# Patient Record
Sex: MALE
[De-identification: names, ages, dates, MRNs, and addresses within clinical notes are randomized per-mention and may not be internally consistent; named-entity substitution may affect disease eponyms.]

---

## 2020-01-29 ENCOUNTER — HOSPITAL ENCOUNTER (INPATIENT)
Dept: HOSPITAL 88 - ER | Age: 65
LOS: 7 days | Discharge: NURSING, REHAB OR LONG-TERM CARE | DRG: 871 | End: 2020-02-05
Attending: INTERNAL MEDICINE | Admitting: INTERNAL MEDICINE
Payer: MEDICARE

## 2020-01-29 VITALS — HEIGHT: 63 IN | BODY MASS INDEX: 27.46 KG/M2 | WEIGHT: 155 LBS

## 2020-01-29 VITALS — SYSTOLIC BLOOD PRESSURE: 184 MMHG | DIASTOLIC BLOOD PRESSURE: 89 MMHG

## 2020-01-29 DIAGNOSIS — J18.1: ICD-10-CM

## 2020-01-29 DIAGNOSIS — N39.0: ICD-10-CM

## 2020-01-29 DIAGNOSIS — B96.20: ICD-10-CM

## 2020-01-29 DIAGNOSIS — E87.1: ICD-10-CM

## 2020-01-29 DIAGNOSIS — N17.9: ICD-10-CM

## 2020-01-29 DIAGNOSIS — A41.9: Primary | ICD-10-CM

## 2020-01-29 DIAGNOSIS — Z16.12: ICD-10-CM

## 2020-01-29 DIAGNOSIS — K59.00: ICD-10-CM

## 2020-01-29 DIAGNOSIS — E86.0: ICD-10-CM

## 2020-01-29 DIAGNOSIS — E87.2: ICD-10-CM

## 2020-01-29 LAB
ALBUMIN SERPL-MCNC: 2.4 G/DL (ref 3.5–5)
ALBUMIN/GLOB SERPL: 0.5 {RATIO} (ref 0.8–2)
ALP SERPL-CCNC: 160 IU/L (ref 40–150)
ALT SERPL-CCNC: 40 IU/L (ref 0–55)
ANION GAP SERPL CALC-SCNC: 16.5 MMOL/L (ref 8–16)
BACTERIA URNS QL MICRO: (no result) /HPF
BASOPHILS # BLD AUTO: 0.1 10*3/UL (ref 0–0.1)
BASOPHILS NFR BLD AUTO: 0.4 % (ref 0–1)
BILIRUB UR QL: NEGATIVE
BUN SERPL-MCNC: 45 MG/DL (ref 7–26)
BUN/CREAT SERPL: 20 (ref 6–25)
CALCIUM SERPL-MCNC: 8.7 MG/DL (ref 8.4–10.2)
CHLORIDE SERPL-SCNC: 97 MMOL/L (ref 98–107)
CK MB SERPL-MCNC: 2.4 NG/ML (ref 0–5)
CK SERPL-CCNC: 35 IU/L (ref 30–200)
CLARITY UR: (no result)
CO2 SERPL-SCNC: 19 MMOL/L (ref 22–29)
COLOR UR: YELLOW
DEPRECATED NEUTROPHILS # BLD AUTO: 10.6 10*3/UL (ref 2.1–6.9)
EGFRCR SERPLBLD CKD-EPI 2021: 29 ML/MIN (ref 60–?)
EOSINOPHIL # BLD AUTO: 0.1 10*3/UL (ref 0–0.4)
EOSINOPHIL NFR BLD AUTO: 0.6 % (ref 0–6)
EPI CELLS URNS QL MICRO: (no result) /LPF
ERYTHROCYTE [DISTWIDTH] IN CORD BLOOD: 12 % (ref 11.7–14.4)
GLOBULIN PLAS-MCNC: 4.4 G/DL (ref 2.3–3.5)
GLUCOSE SERPLBLD-MCNC: 183 MG/DL (ref 74–118)
HCT VFR BLD AUTO: 47.4 % (ref 38.2–49.6)
HGB BLD-MCNC: 17.2 G/DL (ref 14–18)
KETONES UR QL STRIP.AUTO: NEGATIVE
LEUKOCYTE ESTERASE UR QL STRIP.AUTO: (no result)
LYMPHOCYTES # BLD: 0.6 10*3/UL (ref 1–3.2)
LYMPHOCYTES NFR BLD AUTO: 4.3 % (ref 18–39.1)
MCH RBC QN AUTO: 33 PG (ref 28–32)
MCHC RBC AUTO-ENTMCNC: 36.3 G/DL (ref 31–35)
MCV RBC AUTO: 90.8 FL (ref 81–99)
MONOCYTES # BLD AUTO: 2.3 10*3/UL (ref 0.2–0.8)
MONOCYTES NFR BLD AUTO: 16.6 % (ref 4.4–11.3)
NEUTS SEG NFR BLD AUTO: 76 % (ref 38.7–80)
NITRITE UR QL STRIP.AUTO: NEGATIVE
PLATELET # BLD AUTO: 231 X10E3/UL (ref 140–360)
POTASSIUM SERPL-SCNC: 3.5 MMOL/L (ref 3.5–5.1)
PROT UR QL STRIP.AUTO: (no result)
RBC # BLD AUTO: 5.22 X10E6/UL (ref 4.3–5.7)
SODIUM SERPL-SCNC: 129 MMOL/L (ref 136–145)
SP GR UR STRIP: 1.01 (ref 1.01–1.02)
UROBILINOGEN UR STRIP-MCNC: 0.2 MG/DL (ref 0.2–1)
WBC #/AREA URNS HPF: >50 /HPF (ref 0–5)

## 2020-01-29 PROCEDURE — 84100 ASSAY OF PHOSPHORUS: CPT

## 2020-01-29 PROCEDURE — 80048 BASIC METABOLIC PNL TOTAL CA: CPT

## 2020-01-29 PROCEDURE — 74470 X-RAY EXAM OF KIDNEY LESION: CPT

## 2020-01-29 PROCEDURE — 77001 FLUOROGUIDE FOR VEIN DEVICE: CPT

## 2020-01-29 PROCEDURE — 84484 ASSAY OF TROPONIN QUANT: CPT

## 2020-01-29 PROCEDURE — 85730 THROMBOPLASTIN TIME PARTIAL: CPT

## 2020-01-29 PROCEDURE — 99284 EMERGENCY DEPT VISIT MOD MDM: CPT

## 2020-01-29 PROCEDURE — 83735 ASSAY OF MAGNESIUM: CPT

## 2020-01-29 PROCEDURE — 85007 BL SMEAR W/DIFF WBC COUNT: CPT

## 2020-01-29 PROCEDURE — 76770 US EXAM ABDO BACK WALL COMP: CPT

## 2020-01-29 PROCEDURE — 36558 INSERT TUNNELED CV CATH: CPT

## 2020-01-29 PROCEDURE — 99152 MOD SED SAME PHYS/QHP 5/>YRS: CPT

## 2020-01-29 PROCEDURE — 80053 COMPREHEN METABOLIC PANEL: CPT

## 2020-01-29 PROCEDURE — 82550 ASSAY OF CK (CPK): CPT

## 2020-01-29 PROCEDURE — 74176 CT ABD & PELVIS W/O CONTRAST: CPT

## 2020-01-29 PROCEDURE — 36415 COLL VENOUS BLD VENIPUNCTURE: CPT

## 2020-01-29 PROCEDURE — 82553 CREATINE MB FRACTION: CPT

## 2020-01-29 PROCEDURE — 81001 URINALYSIS AUTO W/SCOPE: CPT

## 2020-01-29 PROCEDURE — 85027 COMPLETE CBC AUTOMATED: CPT

## 2020-01-29 PROCEDURE — 85610 PROTHROMBIN TIME: CPT

## 2020-01-29 PROCEDURE — 87205 SMEAR GRAM STAIN: CPT

## 2020-01-29 PROCEDURE — 85025 COMPLETE CBC W/AUTO DIFF WBC: CPT

## 2020-01-29 PROCEDURE — 87086 URINE CULTURE/COLONY COUNT: CPT

## 2020-01-29 PROCEDURE — 71045 X-RAY EXAM CHEST 1 VIEW: CPT

## 2020-01-29 PROCEDURE — 83036 HEMOGLOBIN GLYCOSYLATED A1C: CPT

## 2020-01-29 PROCEDURE — 76937 US GUIDE VASCULAR ACCESS: CPT

## 2020-01-29 PROCEDURE — 87070 CULTURE OTHR SPECIMN AEROBIC: CPT

## 2020-01-29 PROCEDURE — 82948 REAGENT STRIP/BLOOD GLUCOSE: CPT

## 2020-01-29 PROCEDURE — 87186 SC STD MICRODIL/AGAR DIL: CPT

## 2020-01-29 RX ADMIN — SODIUM CHLORIDE SCH MLS/HR: 9 INJECTION, SOLUTION INTRAVENOUS at 13:52

## 2020-01-29 RX ADMIN — DOCUSATE SODIUM SCH MG: 100 TABLET, FILM COATED ORAL at 21:39

## 2020-01-29 RX ADMIN — CEFTRIAXONE SCH MLS/HR: 100 INJECTION, POWDER, FOR SOLUTION INTRAVENOUS at 13:52

## 2020-01-29 RX ADMIN — SODIUM BICARBONATE SCH MG: 650 TABLET ORAL at 21:39

## 2020-01-29 NOTE — NUR
H&P



cc: constipation



HPI: 64yoM, PCP none, developed constipatoin and lower abdominal pain.  Some 
nausea;  came to ED for eval.



PMH: HTN, cig use

PSHx: none

Allergies; none

FH/SH: ; 1/4ppd cigs; no illicits

Meds; see MAR

ROS: no f/c/s/SOB/cp/leg pain/no back pain/confusion/focal limb weakness.  



v/s; revd

PE:

tired appearing

anicteirc

ns1s2

mod bs

soft nd; mild tenderness lower abdomen

no e/t

skin dry

flat affect

a&ox3; marquez



labs/meds revd



A/P: 64yoM

Constipation

DEJON

Dehydration

UTI

Hyponatremia

Metabolic acidosis



PLAN

IVF; bicarbs; Bowel regimen; IV abx; f/u cx; Check labs in am.

SCD/pepcimilagros Conklin MD, PhD.

## 2020-01-29 NOTE — DIAGNOSTIC IMAGING REPORT
EXAM: CT Abdomen and Pelvis WITHOUT intravenous contrast  



INDICATION: Abdominal pain



COMPARISON: None.



TECHNIQUE: Abdomen and pelvis were scanned utilizing a multidetector helical

scanner from the lung base to the pubic symphysis without administration of IV

contrast. Coronal and sagittal reformations were obtained. 

     

IV CONTRAST: None

ORAL CONTRAST: Gastrografin

            

COMPLICATIONS: None



RADIATION DOSE:

Total DLP:  362.7 mGy*cm



Dose modulation, iterative reconstruction, and/or weight based adjustment of

the mA/kV was utilized to reduce the radiation dose to as low as reasonably

achievable. 



FINDINGS:

LOWER THORAX: Scattered atherosclerotic coronary artery calcifications.



HEPATOBILIARY: No focal liver lesion. Unremarkable gallbladder.



SPLEEN: No splenomegaly.



PANCREAS: No focal masses or ductal dilatation.



ADRENALS: No adrenal nodules.

KIDNEYS/URETERS: No hydronephrosis or renal calculi. Right lower pole exophytic

2.9 cm renal cyst. No solid mass lesions.

PELVIC ORGANS/BLADDER: Unremarkable.



PERITONEUM / RETROPERITONEUM: No free air or fluid.

LYMPH NODES: No lymphadenopathy.

VESSELS: Scattered atherosclerotic calcifications of the nonaneurysmal

abdominal aorta and major branches.



GI TRACT: Sigmoid and distal descending colon diverticulosis without CT

evidence of diverticulitis. No abnormal bowel thickening. No bowel obstruction.



BONES AND SOFT TISSUES: No acute osseous injury. No suspicious lytic or blastic

lesions.



IMPRESSION: 

No acute findings in the abdomen or pelvis. Specifically, no evidence of small

bowel obstruction.



Signed by: Angelo Rolon MD on 1/29/2020 12:47 PM

## 2020-01-29 NOTE — NUR
RECEIVE DPT IN BED AOX3 .RESPIRATIONS ARE EVEN AND UNLABORED .C/O ABD PAIN CALL LIGHT WITH 
IN REACH.CONTINUE TO MONITOR

## 2020-01-29 NOTE — XMS REPORT
Patient Summary Document

                             Created on: 2020



RUBENS LATHAM

External Reference #: [id number]

: 1955

Sex: Male



Demographics







                          Address                   [address] [place]  [address]

 

                          Home Phone                [phone]

 

                          Preferred Language        Unknown

 

                          Marital Status            Unknown

 

                          Buddhism Affiliation     Unknown

 

                          Race                      Unknown

 

                          Ethnic Group              Unknown





Author







                          Author                    [facility]

 

                          Address                   Unknown

 

                          Phone                     Unavailable







Support







                Name            Relationship    Address         Phone

 

                    ADA LATHAM       PRS                 [address]

[place], [place]  [id number] [phone]

 

                    ADA LATHAM       PRS                 [address] [place], [place]  [id number] [phone]







Care Team Providers







                    Care Team Member Name    Role                Phone

 

                          Unavailable               Unavailable







Payers







             Payer Name    Policy Type    Policy Number    Effective Date    Expiration Date







Problems

This patient has no known problems.



Allergies, Adverse Reactions, Alerts







          Allergy Name    Allergy Type    Status    Severity    Reaction(s)    Onset Date    Inactive 

Date                      Treating Clinician        Comments

 

        No Known Allergies    DA      Active    U               2019 00:00:00                     







Medications

This patient has no known medications.



Results







           Test Description    Test Time    Test Comments    Text Results    Atomic Results    Result

 Comments

 

                GLUBED          2019 12:17:00                      

 

   

 

                GLUBED (test code=GLUBED)    126 mg/dL                 Performed by certified  at

 [facility]





[id number] 08:03:00* 





                Test Item       Value           Reference Range    Comments

 

                GLUBED (test code=GLUBED)    163 mg/dL                 Performed by certified  at

 [facility]





[id number] 20:29:00* 





                Test Item       Value           Reference Range    Comments

 

                GLUBED (test code=GLUBED)    173 mg/dL                 Performed by certified  at

 [facility]





[id number] 16:41:00* 





                Test Item       Value           Reference Range    Comments

 

                GLUBED (test code=GLUBED)    140 mg/dL                 Performed by certified  at

 [facility]





[id number] 12:50:00* 





                Test Item       Value           Reference Range    Comments

 

                GLUBED (test code=GLUBED)    189 mg/dL                 Performed by certified  at

 [facility]





[id number] 08:22:00* 





                Test Item       Value           Reference Range    Comments

 

                GLUBED (test code=GLUBED)    129 mg/dL                 Performed by certified  at

 [facility]





BASIC METABOLIC [id number] 07:55:00* 





                Test Item       Value           Reference Range    Comments

 

                SODIUM (test code=NA)    137 mmol/L      136-145          

 

                POTASSIUM (test code=K)    3.2 mmol/L      3.5-5.1          

 

                CHLORIDE (test code=CL)    107.0 mmol/L               

 

                CARBON DIOXIDE (test code=CO2)    19.0 mmol/L     21-32            

 

                ANION GAP (test code=GAP)    14.2            10-20            

 

                GLUCOSE (test code=GLU)    126 mg/dL                  

 

                BLOOD UREA NITROGEN (test code=BUN)    12 mg/dL        7-18             

 

                GLOMERULAR FILTRATION RATE (test code=GFR)    > 60 mL/min     >=60            Estimated GFR by 

using Modified MDRD formula.Chronic kidney disease is defined as either kidney 
damageor GFR <60 mL/min/1.73 m2 for >3 months.

 

                CREATININE (test code=CREAT)    1.00 mg/dL      0.7-1.3          

 

                BUN/CREATININE RATIO (test code=BUN/CREA)    12.0            10-20            

 

                CALCIUM (test code=CA)    8.3 mg/dL       8.5-10.1         





BASIC METABOLIC [id number] 07:38:00* 





                Test Item       Value           Reference Range    Comments

 

                SODIUM (test code=NA)    137 mmol/L      136-145          

 

                POTASSIUM (test code=K)    3.2 mmol/L      3.5-5.1          

 

                CHLORIDE (test code=CL)    107.0 mmol/L               

 

                CARBON DIOXIDE (test code=CO2)     mmol/L         21-32            

 

                ANION GAP (test code=GAP)                    10-20            

 

                GLUCOSE (test code=GLU)     mg/dL                     

 

                BLOOD UREA NITROGEN (test code=BUN)     mg/dL          7-18             

 

                GLOMERULAR FILTRATION RATE (test code=GFR)     mL/min         >=60             

 

                CREATININE (test code=CREAT)     mg/dL          0.7-1.3          

 

                BUN/CREATININE RATIO (test code=BUN/CREA)                    10-20            

 

                CALCIUM (test code=CA)    8.3 mg/dL       8.5-10.1         





CBC W/AUTO [id number] 07:04:00* 





                Test Item       Value           Reference Range    Comments

 

                WHITE BLOOD CELL (test code=WBC)    10.1 K/mm3      4.5-12.5         

 

                RED BLOOD CELL (test code=RBC)    4.73 mill/mm3    4.0-5.8          

 

                HEMOGLOBIN (test code=HGB)    16.1 gram/dL    13.0-17.5       RESULT VERIFIED BY REPEAT ANALYSIS



 

                HEMATOCRIT (test code=HCT)    45.7 %          42.0-52.0        

 

                MEAN CELL VOLUME (test code=MCV)    96.6 fL         80-98            

 

                MEAN CELL HGB (test code=MCH)    34.0 picogram    27.0-33.0        

 

                MEAN CELL HGB CONCETRATION (test code=MCHC)    35.2 gram/dL    33.0-36.0        

 

                RED CELL DISTRIBUTION WIDTH (test code=RDW)    11.8 %          11.6-16.2        

 

                RED CELL DISTRIBUTION WIDTH SD (test code=RDW-SD)    42.0 fL         37.0-51.0        

 

                PLATELET COUNT (test code=PLT)    179 K/mm3       150-450          

 

                MEAN PLATELET VOLUME (test code=MPV)    10.4 fL         6.7-11.0         

 

                NEUTROPHIL % (test code=NT%)    73.5 %          39.0-69.0        

 

                IMMATURE GRANULOCYTE % (test code=IG%)    0.7 %           0.0-5.0          

 

                LYMPHOCYTE % (test code=LY%)    10.8 %          25.0-55.0        

 

                MONOCYTE % (test code=MO%)    14.0 %          0.0-10.0         

 

                EOSINOPHIL % (test code=EO%)    0.7 %           0.0-5.0          

 

                BASOPHIL % (test code=BA%)    0.3 %           0.0-1.0          

 

                NUCLEATED RBC % (test code=NRBC%)    0.0 %           0-0              

 

                NEUTROPHIL # (test code=NT#)    7.43 K/mm3      1.8-7.7          

 

                IMMATURE GRANULOCYTE # (test code=IG#)    0.07 x10 3/uL    0-0.03           

 

                LYMPHOCYTE # (test code=LY#)    1.09 K/mm3      1.0-5.0          

 

                MONOCYTE # (test code=MO#)    1.41 K/mm3      0-0.8            

 

                EOSINOPHIL # (test code=EO#)    0.07 K/mm3      0.0-0.5          

 

                BASOPHIL # (test code=BA#)    0.03 K/mm3      0.0-0.2          

 

                NUCLEATED RBC # (test code=NRBC#)    0.00 K/mm3      0.0-0.1          

 

                MANUAL DIFF REQUIRED (test code=MDIFF)    NO                               





[id number] 21:08:00* 





                Test Item       Value           Reference Range    Comments

 

                GLUBED (test code=GLUBED)    187 mg/dL                 Performed by certified  at

 [facility]





[id number] 17:16:00* 





                Test Item       Value           Reference Range    Comments

 

                GLUBED (test code=GLUBED)    165 mg/dL                 Performed by certified  at

 [facility]





[id number] 12:20:00* 





                Test Item       Value           Reference Range    Comments

 

                GLUBED (test code=GLUBED)    210 mg/dL                 Performed by certified  at

 [facility]





[id number] 08:55:00* 





                Test Item       Value           Reference Range    Comments

 

                GLUBED (test code=GLUBED)    149 mg/dL                 Performed by certified  at

 [facility]





AG STREPTOCOCCUS [id number] 06:22:00* 





                Test Item       Value           Reference Range    Comments

 

                AG STREPTOCOCCUS PNEUMO (test code=STREPPNAG)    NEGATIVE        NEGATIVE         





LEGIONELLA ANTIGEN,URINE,[id number] 06:22:00* 





                Test Item       Value           Reference Range    Comments

 

                LEGIONELLA ANTIGEN,URINE,SARAHI (test code=LEGAGUR)    NEGATIVE        NEGATIVE         





URINALYSIS [id number] 03:45:00* 





                Test Item       Value           Reference Range    Comments

 

                UA COLOR (test code=COLU)    YELLOW          YELLOW           

 

                UA APPEARANCE (test code=APPU)    CLEAR           CLEAR            

 

                UA GLUCOSE DIPSTICK (test code=DGLUU)    NEGATIVE mg/dL    NEGATIVE         

 

                UA BILIRUBIN DIPSTICK (test code=BILU)    NEGATIVE mg/dL    NEGATIVE         

 

                UA KETONE DIPSTICK (test code=KETU)    NEGATIVE mg/dL    NEGATIVE         

 

                UA SPECIFIC GRAVITY (test code=SGU)    1.011           1.001-1.035      

 

                UA BLOOD DIPSTICK (test code=BRADEN)    2+ (Moderate) mg/dL    NEGATIVE         

 

                UA PH DIPSTICK (test code=KATERINE)    5.0             5.0-8.0          

 

                UA PROTEIN DIPSTICK (test code=PROU)    30 (1+) mg/dL    NEGATIVE         

 

                UA UROBILINIOGEN DIPSTICK (test code=URO)    NEGATIVE mg/dL    NEGATIVE         

 

                UA NITRITE DIPSTICK (test code=MIREILLE)    NEGATIVE        NEGATIVE         

 

                UA LEUKOCYTE ESTERASE DIPSTICK (test code=LEUU)    1+ uL           NEGATIVE         

 

                UA WBC (test code=WBCU)    >50 per HPF     0-5              

 

                UA RBC (test code=RBCU)    3-5 #/HPF       0-5              

 

                UA EPITHELIAL CELLS (test code=EPIU)    FEW per HPF     FEW              

 

                UA BACTERIA (test code=BACU)    FEW #/HPF       NONE             

 

                UA MUCUS (test code=MUCU)    FEW #/LPF       FEW              





Urine Source? Clean CatchURINALYSIS [id number] 03:08:00* 





                Test Item       Value           Reference Range    Comments

 

                UA COLOR (test code=COLU)    YELLOW          YELLOW           

 

                UA APPEARANCE (test code=APPU)    CLEAR           CLEAR            

 

                UA GLUCOSE DIPSTICK (test code=DGLUU)    NEGATIVE mg/dL    NEGATIVE         

 

                UA BILIRUBIN DIPSTICK (test code=BILU)    NEGATIVE mg/dL    NEGATIVE         

 

                UA KETONE DIPSTICK (test code=KETU)    NEGATIVE mg/dL    NEGATIVE         

 

                UA SPECIFIC GRAVITY (test code=SGU)    1.011           1.001-1.035      

 

                UA BLOOD DIPSTICK (test code=BRADEN)    2+ (Moderate) mg/dL    NEGATIVE         

 

                UA PH DIPSTICK (test code=KATERINE)    5.0             5.0-8.0          

 

                UA PROTEIN DIPSTICK (test code=PROU)    30 (1+) mg/dL    NEGATIVE         

 

                UA UROBILINIOGEN DIPSTICK (test code=URO)    NEGATIVE mg/dL    NEGATIVE         

 

                UA NITRITE DIPSTICK (test code=MIREILLE)    NEGATIVE        NEGATIVE         

 

                UA LEUKOCYTE ESTERASE DIPSTICK (test code=LEUU)     uL             NEGATIVE         

 

                UA WBC (test code=WBCU)    >50 per HPF     0-5              

 

                UA RBC (test code=RBCU)    3-5 #/HPF       0-5              

 

                UA EPITHELIAL CELLS (test code=EPIU)    FEW per HPF     FEW              

 

                UA BACTERIA (test code=BACU)    FEW #/HPF       NONE             

 

                UA MUCUS (test code=MUCU)    FEW #/LPF       FEW              





Urine Source? Clean [id number] 23:11:00* 





                Test Item       Value           Reference Range    Comments

 

                TROPONIN-I (test code=TROPI)    0.294 ng/mL     0-0.045         Results called to [id number] by

 V.LAB.AMARILYS1 19 2310Critical results verified and read back by Nurse? Y





COMMENTS TO PHLEBOTOMIST: COLLECT 3 HOURS AFTER PREVIOUS                        
  [id number] 22:52:00* 





                Test Item       Value           Reference Range    Comments

 

                GLYCOSYLATED HEMOGLOBIN (HA1C) (test code=GLYHGB)    7.0 % HbA1      4.8-6.0          

 

                ESTIMATED AVERAGE GLUCOSE (test code=EAG)    154 MG/DL                        





[id number] 21:24:00* 





                Test Item       Value           Reference Range    Comments

 

                GLUBED (test code=GLUBED)    150 mg/dL                 Performed by certified  at

 [facility]





LIPID PROFILE (CORONARY RISK)2019 21:17:00* 





                Test Item       Value           Reference Range    Comments

 

                TRIGLYCERIDES (test code=TRIG)    96 mg/dL                   

 

                CHOLESTEROL (test code=CHOL)    118 mg/dL       0-200            

 

                CHOLESTEROL/HDL RATIO (test code=CHOLHDL)    3.0 RATIO       0-4.9           RISK ASSOCIATED WITH

 CHOL/HDL RATIOS:     Risk          Male       Female1/2 AVERAGE        3.43    
   3.27AVERAGE            4.97        4.442X AVERAGE         9.55        7.053X 
AVERAGE         23.39      11.04 REFERENCE VALUE IS RELATED TO RISK LEVELS 
ASRECOMMENDED BY THE WINNIE. HEART, LUNG, AND BLOOD INST.

 

                HDL CHOLESTEROL (test code=HDL)    39 mg/dL        40-60            

 

                LIPOPROTEIN LDL (test code=LDL)    62 mg/dL        100-129         ===========================================================Reference

 Interval:          mg/dL          
mmol/L-----------------------------------------------------------Optimal        
             <100           <2.6Near/above optimal          100-129        2.6-
3.3Borderline High             130-159        3.4-4.1High                       
160-189        4.1-4.9Very High                    &gt;=190          
>=4.9=========This LDL result is a direct measurement.=========





LACTIC [id number] 17:22:00* 





                Test Item       Value           Reference Range    Comments

 

                LACTIC ACID (test code=LACT)    1.4 MMOL/L      0.4-1.9          





[id number] 17:22:00* 





                Test Item       Value           Reference Range    Comments

 

                TROPONIN-I (test code=TROPI)    0.18 ng/mL      0.00-0.056      Results called to KARINA MEDLEYLAB.AV1 19 1722Critical results verified and read back by Nurse? Y





COMMENTS TO PHLEBOTOMIST: COLLECT 3 HOURS AFTER PREVIOUS                        
  SAMPLEPROCALCITONIN (PCT)2019 16:38:00* 





                Test Item       Value           Reference Range    Comments

 

                PROCALCITONIN (PCT) (test code=PROCAL)    0.22 ng/ml                      Concentration   Interpretation

   (ng/mL)      -------------   
--------------------------------------------<0.51           Sepsis is not 
likely.                Local bacterial infection is possible.                
(LOW RISK for progression to Sepsis) 0.51 - 2.00     Sepsis is possible, but 
other conditions                are known to elevate PCT as well.               
(MODERATE RISK for progression to Sepsis) > 2.00          Sepsis is likely, 
unless other causes are                known.                (HIGH RISK for 
progression to Severe Sepsis                or Septic Shock) 10.00           
High likelihood of Severe Sepsis or Septic  or higher     Shock.                
*Increased PCT levels may not always be related to systemic bacterial 
infection.*Low PCT levels do not automatically exclude the presence of bacterial
infection.*All results should be interpreted taking into account the  patients 
history.





CBC W/MANUAL [id number] 15:02:00* 





                Test Item       Value           Reference Range    Comments

 

                WHITE BLOOD CELL (test code=WBC)    12.0 K/mm3      4.5-12.5         

 

                RED BLOOD CELL (test code=RBC)    5.48 mill/mm3    4.0-5.8          

 

                HEMOGLOBIN (test code=HGB)    18.8 gram/dL    13.0-17.5        

 

                HEMATOCRIT (test code=HCT)    52.2 %          42.0-52.0        

 

                MEAN CELL VOLUME (test code=MCV)    95.3 fL         80-98            

 

                MEAN CELL HGB (test code=MCH)    34.3 picogram    27.0-33.0        

 

                MEAN CELL HGB CONCETRATION (test code=MCHC)    36.0 gram/dL    33.0-36.0        

 

                RED CELL DISTRIBUTION WIDTH (test code=RDW)    12.1 %          11.6-16.2        

 

                RED CELL DISTRIBUTION WIDTH SD (test code=RDW-SD)    41.6 fL         39.2-49.5        

 

                PLATELET COUNT (test code=PLT)    219 K/mm3       150-450          

 

                MEAN PLATELET VOLUME (test code=MPV)    10.2 fL         6.7-11.0         

 

                NEUTROPHIL # (test code=NT#)    10.48 K/mm3     1.8-7.7          

 

                LYMPHOCYTE # (test code=LY#)    0.25 K/mm3      1.0-5.0          

 

                MONOCYTE # (test code=MO#)    1.28 K/mm3      0-0.8            

 

                EOSINOPHIL # (test code=EO#)    0.01 K/mm3      0.0-0.5          

 

                BASOPHIL # (test code=BA#)    0.02 K/mm3      0.0-0.2          

 

                MANUAL DIFF REQUIRED (test code=MDIFF)    YES                              

 

                STAIN ACCEPTABILITY (test code=STN ACCEPTABLE)    STAIN ACCEPTABLE                     

 

                TOTAL CELLS COUNTED (test code=TCC)    100 #CELLS                       

 

                SEGMENTED NEUTROPHILS (test code=SEG)    89 %            39-69            

 

                LYMPHOCYTE (test code=LYMPH)    2 %             25-55            

 

                MONOCYTE (test code=MON)    9 %             0-10             

 

                MORPHOLOGY COMMENT (test code=MOC)    NORMAL                           

 

                PLATELET ESTIMATE (test code=PLTEST)    ADEQUATE                         

 

                PLATELET MORPHOLOGY (test code=PLTMORPH)    SIZE VARIABLE                     





- XR CHEST 1 -96-93 14:46:00  Name: RUBENS LATHAM                         
[facility]    : 1955 Age/S:63  /M            [address] 
[facility] [address]           Unit#:[id number]     Loc: [place] [address]               Phys: Ted Macias MD                              
               Acct#: [id number] Dis Date:                   PHONE #: 
[phone]      Status: REG ER                                   FAX #: 
[phone]      Exam Date: 2019           Reason: CODE SEPSIS           
                             EXAMS:                                             
 CPT CODE:      [id number] XR CHEST 1 V                               [id number]      
                     REASON FOR EXAM: CODE SEPSIS, weakness, dizziness          
    EXAM ORDER DATE: 2019 2:08 PM               Ordering M.D.: Ted Macias MD                PROCEDURE:  - XR CHEST 1 V               
COMPARISON:               FINDINGS:  Portable AP frontal view of the chest 
obtained at 2:28 PM       shows patchy airspace opacities of the left base. 
There is no evidence       of effusion. The heart size is minimally enlarged. 
Pulmonary       vasculatures are minimally congested.                  IMP
RESSION: Patchy left basilar atelectasis          ** Electronically Signed by ANA Silvestre on 2019 at 1446 **                      Reported and signed by:
Kenneth Silvestre M.D.                       CC: Ted Macias MD                   
                                                                                
              Technologist: MUNIR ZUNIGA, RT(R),CT                           
  Trnscrpt Data: 2019 (1446) t.SDR.VTL                          Orig Print
D/T: S: 2019 (1452)                         PAGE  1                       
Signed Report                               B-TYPE NATRIURETIC [id number]
14:44:00* 





                Test Item       Value           Reference Range    Comments

 

                B-TYPE NATRIURETIC PEPTIDE (test code=BNP)    237 pg/mL       0-100            





LACTIC [id number] 14:34:00* 





                Test Item       Value           Reference Range    Comments

 

                LACTIC ACID (test code=LACT)    2.1 MMOL/L      0.4-1.9         Results called to KARINA Gilliam V.LAB.AV1 19 1434Critical results verified and read back by Nurse? Y





BASIC METABOLIC [id number] 14:33:00* 





                Test Item       Value           Reference Range    Comments

 

                SODIUM (test code=NA)    133 mmol/L      135-148          

 

                POTASSIUM (test code=K)    3.3 mmol/L      3.5-5.1          

 

                CHLORIDE (test code=CL)    99 mmol/L       101-109          

 

                CARBON DIOXIDE (test code=CO2)    16.9 mmol/L     21-32            

 

                ANION GAP (test code=GAP)    20 mmol/L       10-20            

 

                GLUCOSE (test code=GLU)    250 mg/dL                  

 

                BLOOD UREA NITROGEN (test code=BUN)    10 mg/dL        3-21             

 

                GLOMERULAR FILTRATION RATE (test code=GFR)    46 mL/min       >=60            Estimated GFR by using

 Modified MDRD formula.Chronic kidney disease is defined as either kidney 
damageor GFR <60 mL/min/1.73 m2 for >3 months.

 

                CREATININE (test code=CREAT)    1.52 mg/dL      0.55-1.3         

 

                BUN/CREATININE RATIO (test code=BUN/CREA)    6.6             10-20            

 

                CALCIUM (test code=CA)    8.7 mg/dL       8.4-10.2         





HEPATIC FUNCTION [id number] 14:33:00* 





                Test Item       Value           Reference Range    Comments

 

                TOTAL PROTEIN (test code=PROT)    8.5 g/dL        6.5-8.4          

 

                ALBUMIN (test code=ALB)    3.4 g/dL        3.4-4.8          

 

                GLOBULIN (test code=GLOB)    5.1 G/DL        1-10             

 

                ALBUMIN/GLOBULIN RATIO (test code=A/G)    0.7 RATIO       0.75-1.50        

 

                BILIRUBIN TOTAL (test code=BILT)    0.80 mg/dL      0.0-1.0          

 

                BILIRUBIN DIRECT (test code=BILD)    0.20 mg/dL      0.0-0.30         

 

                SGOT/AST (test code=AST)    45 U/L          6-32             

 

                SGPT/ALT (test code=ALT)    87 U/L          12-78           Note: Change in REFERENCE RANGE due to 

new reagent method.

 

                ALKALINE PHOSPHATASE TOTAL (test code=ALKP)    89 U/L                     





[id number] 14:33:00* 





                Test Item       Value           Reference Range    Comments

 

                LIPASE (test code=LIP)    165 U/L         128-270          





[id number] 14:33:00* 





                Test Item       Value           Reference Range    Comments

 

                TROPONIN-I (test code=TROPI)    0.19 ng/mL      0.00-0.056      Results called to [id number] results verified and read back by Nurse? Y





CBC W/MANUAL [id number] 14:25:00* 





                Test Item       Value           Reference Range    Comments

 

                WHITE BLOOD CELL (test code=WBC)    12.0 K/mm3      4.5-12.5         

 

                RED BLOOD CELL (test code=RBC)    5.48 mill/mm3    4.0-5.8          

 

                HEMOGLOBIN (test code=HGB)    18.8 gram/dL    13.0-17.5        

 

                HEMATOCRIT (test code=HCT)    52.2 %          42.0-52.0        

 

                MEAN CELL VOLUME (test code=MCV)    95.3 fL         80-98            

 

                MEAN CELL HGB (test code=MCH)    34.3 picogram    27.0-33.0        

 

                MEAN CELL HGB CONCETRATION (test code=MCHC)    36.0 gram/dL    33.0-36.0        

 

                RED CELL DISTRIBUTION WIDTH (test code=RDW)    12.1 %          11.6-16.2        

 

                RED CELL DISTRIBUTION WIDTH SD (test code=RDW-SD)    41.6 fL         39.2-49.5        

 

                PLATELET COUNT (test code=PLT)    219 K/mm3       150-450          

 

                MEAN PLATELET VOLUME (test code=MPV)    10.2 fL         6.7-11.0         

 

                NEUTROPHIL # (test code=NT#)    10.48 K/mm3     1.8-7.7          

 

                LYMPHOCYTE # (test code=LY#)    0.25 K/mm3      1.0-5.0          

 

                MONOCYTE # (test code=MO#)    1.28 K/mm3      0-0.8            

 

                EOSINOPHIL # (test code=EO#)    0.01 K/mm3      0.0-0.5          

 

                BASOPHIL # (test code=BA#)    0.02 K/mm3      0.0-0.2          

 

                MANUAL DIFF REQUIRED (test code=MDIFF)    YES                              

 

                STAIN ACCEPTABILITY (test code=STN ACCEPTABLE)                                     

 

                TOTAL CELLS COUNTED (test code=TCC)     #CELLS                          

 

                SEGMENTED NEUTROPHILS (test code=SEG)     %              39-69            

 

                LYMPHOCYTE (test code=LYMPH)     %              25-55            

 

                MONOCYTE (test code=MON)     %              0-10             

 

                MORPHOLOGY COMMENT (test code=MOC)                                     

 

                PLATELET ESTIMATE (test code=PLTEST)                                     

 

                PLATELET MORPHOLOGY (test code=PLTMORPH)                                     





CBC W/MANUAL [id number] 14:23:00* 





                Test Item       Value           Reference Range    Comments

 

                WHITE BLOOD CELL (test code=WBC)    12.0 K/mm3      4.5-12.5         

 

                RED BLOOD CELL (test code=RBC)    5.48 mill/mm3    4.0-5.8          

 

                HEMOGLOBIN (test code=HGB)    18.8 gram/dL    13.0-17.5        

 

                HEMATOCRIT (test code=HCT)    52.2 %          42.0-52.0        

 

                MEAN CELL VOLUME (test code=MCV)    95.3 fL         80-98            

 

                MEAN CELL HGB (test code=MCH)    34.3 picogram    27.0-33.0        

 

                MEAN CELL HGB CONCETRATION (test code=MCHC)    36.0 gram/dL    33.0-36.0        

 

                RED CELL DISTRIBUTION WIDTH (test code=RDW)    12.1 %          11.6-16.2        

 

                RED CELL DISTRIBUTION WIDTH SD (test code=RDW-SD)    41.6 fL         39.2-49.5        

 

                PLATELET COUNT (test code=PLT)    219 K/mm3       150-450          

 

                MEAN PLATELET VOLUME (test code=MPV)    10.2 fL         6.7-11.0         

 

                NEUTROPHIL # (test code=NT#)    10.48 K/mm3     1.8-7.7          

 

                LYMPHOCYTE # (test code=LY#)    0.25 K/mm3      1.0-5.0          

 

                MONOCYTE # (test code=MO#)    1.28 K/mm3      0-0.8            

 

                EOSINOPHIL # (test code=EO#)    0.01 K/mm3      0.0-0.5          

 

                BASOPHIL # (test code=BA#)    0.02 K/mm3      0.0-0.2          

 

                MANUAL DIFF REQUIRED (test code=MDIFF)    YES                              

 

                STAIN ACCEPTABILITY (test code=STN ACCEPTABLE)                                     

 

                TOTAL CELLS COUNTED (test code=TCC)     #CELLS                          

 

                SEGMENTED NEUTROPHILS (test code=SEG)     %              39-69            

 

                LYMPHOCYTE (test code=LYMPH)     %              25-55            

 

                MONOCYTE (test code=MON)     %              0-10             

 

                EOSINOPHIL (test code=EOS)     %              0.0-5.0          

 

                CABOT RINGS (test code=CAB)                                     

 

                MORPHOLOGY COMMENT (test code=MOC)                                     

 

                PLATELET ESTIMATE (test code=PLTEST)                                     

 

                PLATELET MORPHOLOGY (test code=PLTMORPH)                                     





CBC W/MANUAL [id number] 14:23:00* 





                Test Item       Value           Reference Range    Comments

 

                WHITE BLOOD CELL (test code=WBC)    12.0 K/mm3      4.5-12.5         

 

                RED BLOOD CELL (test code=RBC)    5.48 mill/mm3    4.0-5.8          

 

                HEMOGLOBIN (test code=HGB)    18.8 gram/dL    13.0-17.5        

 

                HEMATOCRIT (test code=HCT)    52.2 %          42.0-52.0        

 

                MEAN CELL VOLUME (test code=MCV)    95.3 fL         80-98            

 

                MEAN CELL HGB (test code=MCH)    34.3 picogram    27.0-33.0        

 

                MEAN CELL HGB CONCETRATION (test code=MCHC)    36.0 gram/dL    33.0-36.0        

 

                RED CELL DISTRIBUTION WIDTH (test code=RDW)    12.1 %          11.6-16.2        

 

                RED CELL DISTRIBUTION WIDTH SD (test code=RDW-SD)    41.6 fL         39.2-49.5        

 

                PLATELET COUNT (test code=PLT)    219 K/mm3       150-450          

 

                MEAN PLATELET VOLUME (test code=MPV)    10.2 fL         6.7-11.0         

 

                NEUTROPHIL # (test code=NT#)    10.48 K/mm3     1.8-7.7          

 

                LYMPHOCYTE # (test code=LY#)    0.25 K/mm3      1.0-5.0          

 

                MONOCYTE # (test code=MO#)    1.28 K/mm3      0-0.8            

 

                EOSINOPHIL # (test code=EO#)    0.01 K/mm3      0.0-0.5          

 

                BASOPHIL # (test code=BA#)    0.02 K/mm3      0.0-0.2          

 

                MANUAL DIFF REQUIRED (test code=MDIFF)    YES                              

 

                STAIN ACCEPTABILITY (test code=STN ACCEPTABLE)                                     

 

                TOTAL CELLS COUNTED (test code=TCC)     #CELLS                          

 

                SEGMENTED NEUTROPHILS (test code=SEG)     %              39-69            

 

                LYMPHOCYTE (test code=LYMPH)     %              25-55            

 

                MONOCYTE (test code=MON)     %              0-10             

 

                EOSINOPHIL (test code=EOS)     %              0.0-5.0          

 

                CABOT RINGS (test code=CAB)                                     

 

                MORPHOLOGY COMMENT (test code=MOC)                                     

 

                PLATELET ESTIMATE (test code=PLTEST)                                     

 

                PLATELET MORPHOLOGY (test code=PLTMORPH)                                     





CBC W/MANUAL [id number] 14:23:00* 





                Test Item       Value           Reference Range    Comments

 

                WHITE BLOOD CELL (test code=WBC)    12.0 K/mm3      4.5-12.5         

 

                RED BLOOD CELL (test code=RBC)    5.48 mill/mm3    4.0-5.8          

 

                HEMOGLOBIN (test code=HGB)    18.8 gram/dL    13.0-17.5        

 

                HEMATOCRIT (test code=HCT)    52.2 %          42.0-52.0        

 

                MEAN CELL VOLUME (test code=MCV)    95.3 fL         80-98            

 

                MEAN CELL HGB (test code=MCH)    34.3 picogram    27.0-33.0        

 

                MEAN CELL HGB CONCETRATION (test code=MCHC)    36.0 gram/dL    33.0-36.0        

 

                RED CELL DISTRIBUTION WIDTH (test code=RDW)    12.1 %          11.6-16.2        

 

                RED CELL DISTRIBUTION WIDTH SD (test code=RDW-SD)    41.6 fL         39.2-49.5        

 

                PLATELET COUNT (test code=PLT)    219 K/mm3       150-450          

 

                MEAN PLATELET VOLUME (test code=MPV)    10.2 fL         6.7-11.0         

 

                NEUTROPHIL # (test code=NT#)    10.48 K/mm3     1.8-7.7          

 

                LYMPHOCYTE # (test code=LY#)    0.25 K/mm3      1.0-5.0          

 

                MONOCYTE # (test code=MO#)    1.28 K/mm3      0-0.8            

 

                EOSINOPHIL # (test code=EO#)    0.01 K/mm3      0.0-0.5          

 

                BASOPHIL # (test code=BA#)    0.02 K/mm3      0.0-0.2          

 

                MANUAL DIFF REQUIRED (test code=MDIFF)    YES                              

 

                STAIN ACCEPTABILITY (test code=STN ACCEPTABLE)                                     

 

                TOTAL CELLS COUNTED (test code=TCC)     #CELLS                          

 

                SEGMENTED NEUTROPHILS (test code=SEG)     %              39-69            

 

                LYMPHOCYTE (test code=LYMPH)     %              25-55            

 

                MONOCYTE (test code=MON)     %              0-10             

 

                EOSINOPHIL (test code=EOS)     %              0.0-5.0          

 

                MORPHOLOGY COMMENT (test code=MOC)                                     

 

                PLATELET ESTIMATE (test code=PLTEST)                                     

 

                PLATELET MORPHOLOGY (test code=PLTMORPH)                                     





CBC W/MANUAL [id number] 14:23:00* 





                Test Item       Value           Reference Range    Comments

 

                WHITE BLOOD CELL (test code=WBC)    12.0 K/mm3      4.5-12.5         

 

                RED BLOOD CELL (test code=RBC)    5.48 mill/mm3    4.0-5.8          

 

                HEMOGLOBIN (test code=HGB)    18.8 gram/dL    13.0-17.5        

 

                HEMATOCRIT (test code=HCT)    52.2 %          42.0-52.0        

 

                MEAN CELL VOLUME (test code=MCV)    95.3 fL         80-98            

 

                MEAN CELL HGB (test code=MCH)    34.3 picogram    27.0-33.0        

 

                MEAN CELL HGB CONCETRATION (test code=MCHC)    36.0 gram/dL    33.0-36.0        

 

                RED CELL DISTRIBUTION WIDTH (test code=RDW)    12.1 %          11.6-16.2        

 

                RED CELL DISTRIBUTION WIDTH SD (test code=RDW-SD)    41.6 fL         39.2-49.5        

 

                PLATELET COUNT (test code=PLT)    219 K/mm3       150-450          

 

                MEAN PLATELET VOLUME (test code=MPV)    10.2 fL         6.7-11.0         

 

                NEUTROPHIL # (test code=NT#)    10.48 K/mm3     1.8-7.7          

 

                LYMPHOCYTE # (test code=LY#)    0.25 K/mm3      1.0-5.0          

 

                MONOCYTE # (test code=MO#)    1.28 K/mm3      0-0.8            

 

                EOSINOPHIL # (test code=EO#)    0.01 K/mm3      0.0-0.5          

 

                BASOPHIL # (test code=BA#)    0.02 K/mm3      0.0-0.2          

 

                MANUAL DIFF REQUIRED (test code=MDIFF)    YES                              

 

                STAIN ACCEPTABILITY (test code=STN ACCEPTABLE)                                     

 

                TOTAL CELLS COUNTED (test code=TCC)     #CELLS                          

 

                SEGMENTED NEUTROPHILS (test code=SEG)     %              39-69            

 

                LYMPHOCYTE (test code=LYMPH)     %              25-55            

 

                MONOCYTE (test code=MON)     %              0-10             

 

                EOSINOPHIL (test code=EOS)     %              0.0-5.0          

 

                CABOT RINGS (test code=CAB)                                     

 

                MORPHOLOGY COMMENT (test code=MOC)                                     

 

                PLATELET ESTIMATE (test code=PLTEST)                                     

 

                PLATELET MORPHOLOGY (test code=PLTMORPH)                                     





[id number] 14:05:00* 





                Test Item       Value           Reference Range    Comments

 

                GLUBED (test code=GLUBED)    248 mg/dL                 Performed by certified  at

 [facility] Not[facility]~Notified Nurse~

## 2020-01-30 VITALS — SYSTOLIC BLOOD PRESSURE: 166 MMHG | DIASTOLIC BLOOD PRESSURE: 83 MMHG

## 2020-01-30 VITALS — DIASTOLIC BLOOD PRESSURE: 83 MMHG | SYSTOLIC BLOOD PRESSURE: 166 MMHG

## 2020-01-30 VITALS — SYSTOLIC BLOOD PRESSURE: 134 MMHG | DIASTOLIC BLOOD PRESSURE: 74 MMHG

## 2020-01-30 VITALS — DIASTOLIC BLOOD PRESSURE: 74 MMHG | SYSTOLIC BLOOD PRESSURE: 134 MMHG

## 2020-01-30 VITALS — SYSTOLIC BLOOD PRESSURE: 175 MMHG | DIASTOLIC BLOOD PRESSURE: 80 MMHG

## 2020-01-30 VITALS — SYSTOLIC BLOOD PRESSURE: 172 MMHG | DIASTOLIC BLOOD PRESSURE: 88 MMHG

## 2020-01-30 VITALS — SYSTOLIC BLOOD PRESSURE: 171 MMHG | DIASTOLIC BLOOD PRESSURE: 84 MMHG

## 2020-01-30 VITALS — SYSTOLIC BLOOD PRESSURE: 167 MMHG | DIASTOLIC BLOOD PRESSURE: 73 MMHG

## 2020-01-30 LAB
ALBUMIN SERPL-MCNC: 1.9 G/DL (ref 3.5–5)
ALBUMIN/GLOB SERPL: 0.5 {RATIO} (ref 0.8–2)
ALP SERPL-CCNC: 142 IU/L (ref 40–150)
ALT SERPL-CCNC: 34 IU/L (ref 0–55)
ANION GAP SERPL CALC-SCNC: 12.7 MMOL/L (ref 8–16)
ANION GAP SERPL CALC-SCNC: 13.6 MMOL/L (ref 8–16)
BASOPHILS # BLD AUTO: 0.1 10*3/UL (ref 0–0.1)
BASOPHILS NFR BLD AUTO: 0.5 % (ref 0–1)
BUN SERPL-MCNC: 31 MG/DL (ref 7–26)
BUN SERPL-MCNC: 36 MG/DL (ref 7–26)
BUN/CREAT SERPL: 17 (ref 6–25)
BUN/CREAT SERPL: 19 (ref 6–25)
CALCIUM SERPL-MCNC: 7.8 MG/DL (ref 8.4–10.2)
CALCIUM SERPL-MCNC: 7.9 MG/DL (ref 8.4–10.2)
CHLORIDE SERPL-SCNC: 102 MMOL/L (ref 98–107)
CHLORIDE SERPL-SCNC: 103 MMOL/L (ref 98–107)
CK MB SERPL-MCNC: 1.5 NG/ML (ref 0–5)
CK MB SERPL-MCNC: 1.8 NG/ML (ref 0–5)
CK SERPL-CCNC: 24 IU/L (ref 30–200)
CK SERPL-CCNC: 29 IU/L (ref 30–200)
CO2 SERPL-SCNC: 17 MMOL/L (ref 22–29)
CO2 SERPL-SCNC: 18 MMOL/L (ref 22–29)
DEPRECATED NEUTROPHILS # BLD AUTO: 10.8 10*3/UL (ref 2.1–6.9)
EGFRCR SERPLBLD CKD-EPI 2021: 35 ML/MIN (ref 60–?)
EGFRCR SERPLBLD CKD-EPI 2021: 38 ML/MIN (ref 60–?)
EOSINOPHIL # BLD AUTO: 0.1 10*3/UL (ref 0–0.4)
EOSINOPHIL NFR BLD AUTO: 0.8 % (ref 0–6)
ERYTHROCYTE [DISTWIDTH] IN CORD BLOOD: 12.2 % (ref 11.7–14.4)
GLOBULIN PLAS-MCNC: 3.8 G/DL (ref 2.3–3.5)
GLUCOSE SERPLBLD-MCNC: 126 MG/DL (ref 74–118)
GLUCOSE SERPLBLD-MCNC: 128 MG/DL (ref 74–118)
HCT VFR BLD AUTO: 42.2 % (ref 38.2–49.6)
HGB BLD-MCNC: 15 G/DL (ref 14–18)
LYMPHOCYTES # BLD: 1.1 10*3/UL (ref 1–3.2)
LYMPHOCYTES NFR BLD AUTO: 7.7 % (ref 18–39.1)
MCH RBC QN AUTO: 32.8 PG (ref 28–32)
MCHC RBC AUTO-ENTMCNC: 35.5 G/DL (ref 31–35)
MCV RBC AUTO: 92.1 FL (ref 81–99)
MONOCYTES # BLD AUTO: 2 10*3/UL (ref 0.2–0.8)
MONOCYTES NFR BLD AUTO: 14.1 % (ref 4.4–11.3)
NEUTS SEG NFR BLD AUTO: 74.5 % (ref 38.7–80)
PLATELET # BLD AUTO: 257 X10E3/UL (ref 140–360)
POTASSIUM SERPL-SCNC: 3.6 MMOL/L (ref 3.5–5.1)
POTASSIUM SERPL-SCNC: 3.7 MMOL/L (ref 3.5–5.1)
RBC # BLD AUTO: 4.58 X10E6/UL (ref 4.3–5.7)
SODIUM SERPL-SCNC: 129 MMOL/L (ref 136–145)
SODIUM SERPL-SCNC: 130 MMOL/L (ref 136–145)

## 2020-01-30 RX ADMIN — SODIUM CHLORIDE TAB 1 GM SCH GM: 1 TAB at 16:17

## 2020-01-30 RX ADMIN — FAMOTIDINE SCH MG: 20 TABLET, FILM COATED ORAL at 16:17

## 2020-01-30 RX ADMIN — SODIUM CHLORIDE SCH MLS/HR: 9 INJECTION, SOLUTION INTRAVENOUS at 02:10

## 2020-01-30 RX ADMIN — Medication SCH MG: at 09:02

## 2020-01-30 RX ADMIN — CEFTRIAXONE SCH MLS/HR: 100 INJECTION, POWDER, FOR SOLUTION INTRAVENOUS at 13:00

## 2020-01-30 RX ADMIN — SODIUM CHLORIDE TAB 1 GM SCH GM: 1 TAB at 09:02

## 2020-01-30 RX ADMIN — DOCUSATE SODIUM SCH MG: 100 TABLET, FILM COATED ORAL at 09:02

## 2020-01-30 RX ADMIN — SODIUM BICARBONATE SCH MG: 650 TABLET ORAL at 16:17

## 2020-01-30 RX ADMIN — CEFTRIAXONE SCH MLS/HR: 100 INJECTION, POWDER, FOR SOLUTION INTRAVENOUS at 02:10

## 2020-01-30 RX ADMIN — Medication SCH MG: at 16:17

## 2020-01-30 RX ADMIN — FAMOTIDINE SCH MG: 20 TABLET, FILM COATED ORAL at 09:02

## 2020-01-30 RX ADMIN — SODIUM BICARBONATE SCH MG: 650 TABLET ORAL at 09:02

## 2020-01-30 RX ADMIN — SODIUM CHLORIDE TAB 1 GM SCH GM: 1 TAB at 21:00

## 2020-01-30 RX ADMIN — DOCUSATE SODIUM SCH MG: 100 TABLET, FILM COATED ORAL at 16:17

## 2020-01-30 NOTE — NUR
RECEIVED PT IN BED AOX3 .IV NS IS INFUSING AT 100CC/HR .DENIES PAIN .CALL LIGHT WITH IN 
REACH .CONTINUE TO MONITOR

## 2020-01-30 NOTE — NUR
PATIENT IS IN STABLE CONDITION WITH NO S/S OF RESPIRATORY DISTRESS. NO PAIN VOICED. IV 
FLUIDS INFUSING. CALL LIGHT IS WITHIN REACH, PATIENT INSTRUCTED TO CALL FOR ASSISTANCE AS 
NEEDED. BEDSIDE SHIFT REPORT GIVEN TO ONCOMING NURSE.

## 2020-01-30 NOTE — NUR
PATIENT IS ALERT, AWAKE, AND IN STABLE CONDITION WITH NO S/S OF RESPIRATORY DISTRESS. NO 
PAIN VOICED. IV FLUIDS INFUSING. CALL LIGHT IS WITHIN REACH, PATIENT INSTRUCTED TO CALL FOR 
ASSISTANCE AS NEEDED.

## 2020-01-30 NOTE — NUR
IM- progress note

O/N see below

ROS: no f/c/s/SOB/cp/leg pain/no back pain/confusion/focal limb weakness.  



v/s; revd

PE:

tired appearing

anicteirc

ns1s2

mod bs

soft nd; mild tenderness lower abdomen

no e/t

skin dry

flat affect

a&ox3; marquez



labs/meds revd



A/P: 64yoM

Constipation

DEJON

Dehydration

UTI

Hyponatremia

Metabolic acidosis



PLAN

IVF; bicarbs; Bowel regimen; IV abx; f/u cx; Check labs in am.

SCD/pepcid



1-30 treat hyponatremia and dehydration and metabolic acidosis; 



Beata Conklin MD, PhD.

## 2020-01-31 VITALS — SYSTOLIC BLOOD PRESSURE: 187 MMHG | DIASTOLIC BLOOD PRESSURE: 90 MMHG

## 2020-01-31 VITALS — SYSTOLIC BLOOD PRESSURE: 159 MMHG | DIASTOLIC BLOOD PRESSURE: 79 MMHG

## 2020-01-31 VITALS — SYSTOLIC BLOOD PRESSURE: 148 MMHG | DIASTOLIC BLOOD PRESSURE: 73 MMHG

## 2020-01-31 VITALS — DIASTOLIC BLOOD PRESSURE: 93 MMHG | SYSTOLIC BLOOD PRESSURE: 187 MMHG

## 2020-01-31 VITALS — DIASTOLIC BLOOD PRESSURE: 93 MMHG | SYSTOLIC BLOOD PRESSURE: 170 MMHG

## 2020-01-31 VITALS — SYSTOLIC BLOOD PRESSURE: 171 MMHG | DIASTOLIC BLOOD PRESSURE: 85 MMHG

## 2020-01-31 LAB
DEPRECATED APTT PLAS QN: 36.3 SECONDS (ref 23.8–35.5)
DEPRECATED INR PLAS: 1.36
PROTHROMBIN TIME: 17.2 SECONDS (ref 11.9–14.5)

## 2020-01-31 PROCEDURE — 0JH63XZ INSERTION OF TUNNELED VASCULAR ACCESS DEVICE INTO CHEST SUBCUTANEOUS TISSUE AND FASCIA, PERCUTANEOUS APPROACH: ICD-10-PCS

## 2020-01-31 PROCEDURE — 02HV33Z INSERTION OF INFUSION DEVICE INTO SUPERIOR VENA CAVA, PERCUTANEOUS APPROACH: ICD-10-PCS

## 2020-01-31 RX ADMIN — SODIUM CHLORIDE SCH MLS/HR: 9 INJECTION, SOLUTION INTRAVENOUS at 23:30

## 2020-01-31 RX ADMIN — Medication SCH MG: at 08:54

## 2020-01-31 RX ADMIN — CEFTRIAXONE SCH MLS/HR: 100 INJECTION, POWDER, FOR SOLUTION INTRAVENOUS at 02:32

## 2020-01-31 RX ADMIN — DOCUSATE SODIUM SCH MG: 100 TABLET, FILM COATED ORAL at 08:54

## 2020-01-31 RX ADMIN — LABETALOL HCL SCH MG: 100 TABLET, FILM COATED ORAL at 21:58

## 2020-01-31 RX ADMIN — MEROPENEM SCH MLS/HR: 500 INJECTION INTRAVENOUS at 21:57

## 2020-01-31 RX ADMIN — SODIUM CHLORIDE TAB 1 GM SCH GM: 1 TAB at 14:27

## 2020-01-31 RX ADMIN — FAMOTIDINE SCH MG: 20 TABLET, FILM COATED ORAL at 15:50

## 2020-01-31 RX ADMIN — SODIUM CHLORIDE SCH MLS/HR: 9 INJECTION, SOLUTION INTRAVENOUS at 16:30

## 2020-01-31 RX ADMIN — SODIUM CHLORIDE TAB 1 GM SCH GM: 1 TAB at 21:57

## 2020-01-31 RX ADMIN — SODIUM BICARBONATE SCH MG: 650 TABLET ORAL at 16:32

## 2020-01-31 RX ADMIN — SODIUM CHLORIDE SCH MLS/HR: 9 INJECTION, SOLUTION INTRAVENOUS at 06:30

## 2020-01-31 RX ADMIN — Medication SCH MG: at 15:50

## 2020-01-31 RX ADMIN — FAMOTIDINE SCH MG: 20 TABLET, FILM COATED ORAL at 08:54

## 2020-01-31 RX ADMIN — SODIUM BICARBONATE SCH MG: 650 TABLET ORAL at 08:54

## 2020-01-31 RX ADMIN — DOCUSATE SODIUM SCH MG: 100 TABLET, FILM COATED ORAL at 16:32

## 2020-01-31 RX ADMIN — LABETALOL HCL SCH MG: 100 TABLET, FILM COATED ORAL at 08:53

## 2020-01-31 RX ADMIN — SODIUM CHLORIDE TAB 1 GM SCH GM: 1 TAB at 08:54

## 2020-01-31 RX ADMIN — MEROPENEM SCH MLS/HR: 500 INJECTION INTRAVENOUS at 09:18

## 2020-01-31 NOTE — NUR
PATIENT IS IN STABLE CONDITION WITH NO S/S OF RESPIRATORY DISTRESS. NO PAIN VOICED. IV 
FLUIDS INFUSING. IV TO RIGHT AC WAS REMOVED WIT TIP INTACT. CALL LIGHT IS WITHIN REACH- 
PATIENT INSTRUCTED TO CALL FOR ASSISTANCE AS NEEDED. BEDSIDE SHIFT REPORT GIVEN TO ONCOMING 
NURSE.

## 2020-01-31 NOTE — NUR
B/P  WAS HIGH DURING THE NIGHT .CALLED DR LIVINGSTON AND GIVEN   ORDER TO GIVE TRANDATE 100MG PO 
.MEDICATED WITH TRANDATE 100 MG PO .BEDSIDE REPORT GIVEN TO THE ONCOMING NURSE

## 2020-01-31 NOTE — NUR
SINGED CHOICE FOR MEDICAL [facility] [place] [facility] FILED IN CHART, PRINTED CLINICALS AND FAXED TO 
FACILITY [phone], EDUCATED ABOUT [facility], SIGNED, FILED IN CHART, WITH COPY LEFT WITH 
FAMILY AT BEDSIDE. COMPLETED RTF AN D MADE PACKET FOR NURSES STATION.

## 2020-01-31 NOTE — DIAGNOSTIC IMAGING REPORT
Tunneled central line placement, 1/31/2020.





History: UTI, need for long-term IV antibiotics.



Comparison: None available.



: Dr. Ignacio.



Medication: 5 cc of 1% lidocaine without epinephrine. 



Conscious sedation: 1 mg Versed, 50 mcg fentanyl IV. Physician intra-service

sedation time: 20 minutes.



EBL: < 2 cc.



Fluoroscopy time: 0.4 minutes.

Fluoroscopy dose: 4 mGy (JANINA)



Specimen: None.



Technique:  After informed consent and timeout procedure, the access site was

prepped and draped with the standard maximal sterile barrier technique.

Ultrasound images demonstrated vessel patency. Images were documented within

PACS. The skin was anesthetized with lidocaine. The right  internal jugular

vein was accessed using a micropuncture set with ultrasound guidance.  A

0.035-in. wire was advanced through the micropuncture sheath into the vein. The

wire was advanced through the atrium into the IVC using fluoroscopic guidance.

The right chest was further anesthetized with lidocaine to form the

subcutaneous tunnel. The catheter was advanced through the tunnel. The tract

was dilated. A peel-away sheath was placed. A 6 Sami dual-lumen Powerline

catheter was trimmed to the appropriate length and placed into the vessel via

the peel-away sheath, which was then removed. Both ports demonstrated normal

aspiration and flushing. The catheter was secured with suture. Dressing was

applied. The patient tolerated the procedure well without evidence of

complication. Postprocedure image demonstrates the line to terminate near the

cavoatrial junction.



IMPRESSION: 

Successful tunneled central line placement with ultrasound and fluoroscopic

guidance guidance, with conscious sedation.



Signed by: Jayce Ignacio on 1/31/2020 4:42 PM

## 2020-01-31 NOTE — NUR
PT RESTED DURING THE NIGHT .DENIES PAIN PT HAD SHOWER .CALL LIGHT WITH IN REACH .CONTINUE TO 
MONITOR

## 2020-01-31 NOTE — DIAGNOSTIC IMAGING REPORT
Tunneled central line placement, 1/31/2020.





History: UTI, need for long-term IV antibiotics.



Comparison: None available.



: Dr. Ignacio.



Medication: 5 cc of 1% lidocaine without epinephrine. 



Conscious sedation: 1 mg Versed, 50 mcg fentanyl IV. Physician intra-service

sedation time: 20 minutes.



EBL: < 2 cc.



Fluoroscopy time: 0.4 minutes.

Fluoroscopy dose: 4 mGy (JANINA)



Specimen: None.



Technique:  After informed consent and timeout procedure, the access site was

prepped and draped with the standard maximal sterile barrier technique.

Ultrasound images demonstrated vessel patency. Images were documented within

PACS. The skin was anesthetized with lidocaine. The right  internal jugular

vein was accessed using a micropuncture set with ultrasound guidance.  A

0.035-in. wire was advanced through the micropuncture sheath into the vein. The

wire was advanced through the atrium into the IVC using fluoroscopic guidance.

The right chest was further anesthetized with lidocaine to form the

subcutaneous tunnel. The catheter was advanced through the tunnel. The tract

was dilated. A peel-away sheath was placed. A 6 Greek dual-lumen Powerline

catheter was trimmed to the appropriate length and placed into the vessel via

the peel-away sheath, which was then removed. Both ports demonstrated normal

aspiration and flushing. The catheter was secured with suture. Dressing was

applied. The patient tolerated the procedure well without evidence of

complication. Postprocedure image demonstrates the line to terminate near the

cavoatrial junction.



IMPRESSION: 

Successful tunneled central line placement with ultrasound and fluoroscopic

guidance guidance, with conscious sedation.



Signed by: Jayce Ignacio on 1/31/2020 4:42 PM

## 2020-01-31 NOTE — DIAGNOSTIC IMAGING REPORT
Tunneled central line placement, 1/31/2020.





History: UTI, need for long-term IV antibiotics.



Comparison: None available.



: Dr. Ignacio.



Medication: 5 cc of 1% lidocaine without epinephrine. 



Conscious sedation: 1 mg Versed, 50 mcg fentanyl IV. Physician intra-service

sedation time: 20 minutes.



EBL: < 2 cc.



Fluoroscopy time: 0.4 minutes.

Fluoroscopy dose: 4 mGy (JANINA)



Specimen: None.



Technique:  After informed consent and timeout procedure, the access site was

prepped and draped with the standard maximal sterile barrier technique.

Ultrasound images demonstrated vessel patency. Images were documented within

PACS. The skin was anesthetized with lidocaine. The right  internal jugular

vein was accessed using a micropuncture set with ultrasound guidance.  A

0.035-in. wire was advanced through the micropuncture sheath into the vein. The

wire was advanced through the atrium into the IVC using fluoroscopic guidance.

The right chest was further anesthetized with lidocaine to form the

subcutaneous tunnel. The catheter was advanced through the tunnel. The tract

was dilated. A peel-away sheath was placed. A 6 Swedish dual-lumen Powerline

catheter was trimmed to the appropriate length and placed into the vessel via

the peel-away sheath, which was then removed. Both ports demonstrated normal

aspiration and flushing. The catheter was secured with suture. Dressing was

applied. The patient tolerated the procedure well without evidence of

complication. Postprocedure image demonstrates the line to terminate near the

cavoatrial junction.



IMPRESSION: 

Successful tunneled central line placement with ultrasound and fluoroscopic

guidance guidance, with conscious sedation.



Signed by: Jayce Ignacio on 1/31/2020 4:42 PM

## 2020-01-31 NOTE — NUR
IM- progress note

O/N see below

ROS: no f/c/s/SOB/cp/leg pain/no back pain/confusion/focal limb weakness.  



v/s; revd

PE:

tired appearing

anicteirc

ns1s2

mod bs

soft nd; mild tenderness lower abdomen

no e/t

skin dry

flat affect

a&ox3; marquez



labs/meds revd



A/P: 64yoM

Constipation

DEJON

Dehydration

UTI

Hyponatremia

Metabolic acidosis



PLAN

IVF; bicarbs; Bowel regimen; IV abx; f/u cx; Check labs in am.

SCD/pepcid



1-30 treat hyponatremia and dehydration and metabolic acidosis; 

1-31 ESBL E.coli UTI- merrem; IJ needed. 

Beata Conklin MD, PhD.

## 2020-02-01 VITALS — DIASTOLIC BLOOD PRESSURE: 75 MMHG | SYSTOLIC BLOOD PRESSURE: 149 MMHG

## 2020-02-01 VITALS — SYSTOLIC BLOOD PRESSURE: 145 MMHG | DIASTOLIC BLOOD PRESSURE: 77 MMHG

## 2020-02-01 VITALS — SYSTOLIC BLOOD PRESSURE: 151 MMHG | DIASTOLIC BLOOD PRESSURE: 74 MMHG

## 2020-02-01 VITALS — DIASTOLIC BLOOD PRESSURE: 89 MMHG | SYSTOLIC BLOOD PRESSURE: 173 MMHG

## 2020-02-01 VITALS — DIASTOLIC BLOOD PRESSURE: 74 MMHG | SYSTOLIC BLOOD PRESSURE: 151 MMHG

## 2020-02-01 VITALS — DIASTOLIC BLOOD PRESSURE: 75 MMHG | SYSTOLIC BLOOD PRESSURE: 143 MMHG

## 2020-02-01 LAB
ANION GAP SERPL CALC-SCNC: 14.7 MMOL/L (ref 8–16)
BASOPHILS # BLD AUTO: 0.1 10*3/UL (ref 0–0.1)
BASOPHILS NFR BLD AUTO: 0.5 % (ref 0–1)
BUN SERPL-MCNC: 24 MG/DL (ref 7–26)
BUN/CREAT SERPL: 14 (ref 6–25)
CALCIUM SERPL-MCNC: 7.7 MG/DL (ref 8.4–10.2)
CHLORIDE SERPL-SCNC: 108 MMOL/L (ref 98–107)
CO2 SERPL-SCNC: 17 MMOL/L (ref 22–29)
DEPRECATED NEUTROPHILS # BLD AUTO: 13.4 10*3/UL (ref 2.1–6.9)
EGFRCR SERPLBLD CKD-EPI 2021: 41 ML/MIN (ref 60–?)
EOSINOPHIL # BLD AUTO: 0.3 10*3/UL (ref 0–0.4)
EOSINOPHIL NFR BLD AUTO: 1.6 % (ref 0–6)
ERYTHROCYTE [DISTWIDTH] IN CORD BLOOD: 12.4 % (ref 11.7–14.4)
GLUCOSE SERPLBLD-MCNC: 137 MG/DL (ref 74–118)
HCT VFR BLD AUTO: 38 % (ref 38.2–49.6)
HGB BLD-MCNC: 13.5 G/DL (ref 14–18)
LYMPHOCYTES # BLD: 1.2 10*3/UL (ref 1–3.2)
LYMPHOCYTES NFR BLD AUTO: 7.4 % (ref 18–39.1)
MCH RBC QN AUTO: 32.8 PG (ref 28–32)
MCHC RBC AUTO-ENTMCNC: 35.5 G/DL (ref 31–35)
MCV RBC AUTO: 92.2 FL (ref 81–99)
MONOCYTES # BLD AUTO: 1.1 10*3/UL (ref 0.2–0.8)
MONOCYTES NFR BLD AUTO: 6.9 % (ref 4.4–11.3)
NEUTS SEG NFR BLD AUTO: 80.8 % (ref 38.7–80)
PLATELET # BLD AUTO: 297 X10E3/UL (ref 140–360)
POTASSIUM SERPL-SCNC: 3.7 MMOL/L (ref 3.5–5.1)
RBC # BLD AUTO: 4.12 X10E6/UL (ref 4.3–5.7)
SODIUM SERPL-SCNC: 136 MMOL/L (ref 136–145)

## 2020-02-01 RX ADMIN — MEROPENEM SCH MLS/HR: 500 INJECTION INTRAVENOUS at 10:06

## 2020-02-01 RX ADMIN — DOCUSATE SODIUM SCH MG: 100 TABLET, FILM COATED ORAL at 09:00

## 2020-02-01 RX ADMIN — Medication SCH MG: at 10:07

## 2020-02-01 RX ADMIN — FAMOTIDINE SCH MG: 20 TABLET, FILM COATED ORAL at 16:23

## 2020-02-01 RX ADMIN — SODIUM BICARBONATE SCH MG: 650 TABLET ORAL at 10:07

## 2020-02-01 RX ADMIN — SODIUM CHLORIDE TAB 1 GM SCH GM: 1 TAB at 10:07

## 2020-02-01 RX ADMIN — Medication SCH MG: at 07:30

## 2020-02-01 RX ADMIN — SODIUM CHLORIDE SCH MLS/HR: 9 INJECTION, SOLUTION INTRAVENOUS at 12:30

## 2020-02-01 RX ADMIN — LABETALOL HCL SCH MG: 100 TABLET, FILM COATED ORAL at 21:12

## 2020-02-01 RX ADMIN — SODIUM BICARBONATE SCH MG: 650 TABLET ORAL at 16:22

## 2020-02-01 RX ADMIN — DOCUSATE SODIUM SCH MG: 100 TABLET, FILM COATED ORAL at 17:00

## 2020-02-01 RX ADMIN — FAMOTIDINE SCH MG: 20 TABLET, FILM COATED ORAL at 10:06

## 2020-02-01 RX ADMIN — Medication SCH MG: at 16:30

## 2020-02-01 RX ADMIN — LABETALOL HCL SCH MG: 100 TABLET, FILM COATED ORAL at 10:07

## 2020-02-01 RX ADMIN — MEROPENEM SCH MLS/HR: 500 INJECTION INTRAVENOUS at 21:13

## 2020-02-01 RX ADMIN — SODIUM CHLORIDE SCH MLS/HR: 9 INJECTION, SOLUTION INTRAVENOUS at 21:13

## 2020-02-01 RX ADMIN — SODIUM CHLORIDE TAB 1 GM SCH GM: 1 TAB at 15:22

## 2020-02-01 RX ADMIN — SODIUM CHLORIDE TAB 1 GM SCH GM: 1 TAB at 21:12

## 2020-02-01 RX ADMIN — GUAIFENESIN AND DEXTROMETHORPHAN SCH ML: 100; 10 SYRUP ORAL at 21:13

## 2020-02-01 NOTE — NUR
NOTIFIED DR. LIVINGSTON OF THE SIRS ALERT. MD SAID TO JUST WATCH PATIENT AND ORDER CHEST XRAY IN 
THE MORNING.

## 2020-02-01 NOTE — NUR
SPOKE TO DR. LIVINGSTON REGARDING PATIENT C/O COUGH. NEW ORDER RECEIVED FOR COUGH MEDICINE 
SCHEDULED.

## 2020-02-01 NOTE — NUR
IM- progress note

O/N see below

ROS: no f/c/s/SOB/cp/leg pain/no back pain/confusion/focal limb weakness.  



v/s; revd

PE:

tired appearing

anicteirc

ns1s2

mod bs

soft nd; mild tenderness lower abdomen

no e/t

skin dry

flat affect

a&ox3; marquez



labs/meds revd



A/P: 64yoM

Constipation

DEJON

Dehydration

UTI

Hyponatremia

Metabolic acidosis



PLAN

IVF; bicarbs; Bowel regimen; IV abx; f/u cx; Check labs in am.

SCD/pepcid



1-30 treat hyponatremia and dehydration and metabolic acidosis; 

1-31 ESBL E.coli UTI- merrem; IJ needed. 

2/1 check labs in am.

Beata Conklin MD, PhD.

## 2020-02-02 VITALS — SYSTOLIC BLOOD PRESSURE: 157 MMHG | DIASTOLIC BLOOD PRESSURE: 87 MMHG

## 2020-02-02 VITALS — SYSTOLIC BLOOD PRESSURE: 167 MMHG | DIASTOLIC BLOOD PRESSURE: 77 MMHG

## 2020-02-02 VITALS — DIASTOLIC BLOOD PRESSURE: 86 MMHG | SYSTOLIC BLOOD PRESSURE: 151 MMHG

## 2020-02-02 VITALS — DIASTOLIC BLOOD PRESSURE: 83 MMHG | SYSTOLIC BLOOD PRESSURE: 167 MMHG

## 2020-02-02 VITALS — SYSTOLIC BLOOD PRESSURE: 165 MMHG | DIASTOLIC BLOOD PRESSURE: 86 MMHG

## 2020-02-02 VITALS — DIASTOLIC BLOOD PRESSURE: 85 MMHG | SYSTOLIC BLOOD PRESSURE: 152 MMHG

## 2020-02-02 LAB
ANION GAP SERPL CALC-SCNC: 14.1 MMOL/L (ref 8–16)
BASOPHILS # BLD AUTO: 0.1 10*3/UL (ref 0–0.1)
BASOPHILS NFR BLD AUTO: 0.5 % (ref 0–1)
BUN SERPL-MCNC: 20 MG/DL (ref 7–26)
BUN/CREAT SERPL: 12 (ref 6–25)
CALCIUM SERPL-MCNC: 7.7 MG/DL (ref 8.4–10.2)
CHLORIDE SERPL-SCNC: 108 MMOL/L (ref 98–107)
CO2 SERPL-SCNC: 18 MMOL/L (ref 22–29)
DEPRECATED NEUTROPHILS # BLD AUTO: 14.3 10*3/UL (ref 2.1–6.9)
DEPRECATED PHOSPHATE SERPL-MCNC: 2.6 MG/DL (ref 2.3–4.7)
EGFRCR SERPLBLD CKD-EPI 2021: 43 ML/MIN (ref 60–?)
EOSINOPHIL # BLD AUTO: 0.2 10*3/UL (ref 0–0.4)
EOSINOPHIL NFR BLD AUTO: 1.1 % (ref 0–6)
ERYTHROCYTE [DISTWIDTH] IN CORD BLOOD: 12.4 % (ref 11.7–14.4)
GLUCOSE SERPLBLD-MCNC: 157 MG/DL (ref 74–118)
HCT VFR BLD AUTO: 38.6 % (ref 38.2–49.6)
HGB BLD-MCNC: 13.3 G/DL (ref 14–18)
LYMPHOCYTES # BLD: 0.9 10*3/UL (ref 1–3.2)
LYMPHOCYTES NFR BLD AUTO: 5.1 % (ref 18–39.1)
LYMPHOCYTES NFR BLD MANUAL: 3 % (ref 19–48)
MAGNESIUM SERPL-MCNC: 1.6 MG/DL (ref 1.3–2.1)
MCH RBC QN AUTO: 32.8 PG (ref 28–32)
MCHC RBC AUTO-ENTMCNC: 34.5 G/DL (ref 31–35)
MCV RBC AUTO: 95.3 FL (ref 81–99)
MONOCYTES # BLD AUTO: 1.2 10*3/UL (ref 0.2–0.8)
MONOCYTES NFR BLD AUTO: 7 % (ref 4.4–11.3)
MONOCYTES NFR BLD MANUAL: 12 % (ref 3.4–9)
NEUTS BAND NFR BLD MANUAL: 1 %
NEUTS SEG NFR BLD AUTO: 84.1 % (ref 38.7–80)
NEUTS SEG NFR BLD MANUAL: 83 % (ref 40–74)
PLAT MORPH BLD: NORMAL
PLATELET # BLD AUTO: 303 X10E3/UL (ref 140–360)
PLATELET # BLD EST: ADEQUATE 10*3/UL
POTASSIUM SERPL-SCNC: 4.1 MMOL/L (ref 3.5–5.1)
RBC # BLD AUTO: 4.05 X10E6/UL (ref 4.3–5.7)
RBC MORPH BLD: NORMAL
SODIUM SERPL-SCNC: 136 MMOL/L (ref 136–145)

## 2020-02-02 RX ADMIN — Medication SCH MG: at 07:30

## 2020-02-02 RX ADMIN — GUAIFENESIN AND DEXTROMETHORPHAN SCH ML: 100; 10 SYRUP ORAL at 14:23

## 2020-02-02 RX ADMIN — LABETALOL HCL SCH MG: 100 TABLET, FILM COATED ORAL at 08:58

## 2020-02-02 RX ADMIN — Medication SCH MG: at 08:58

## 2020-02-02 RX ADMIN — Medication SCH MG: at 16:30

## 2020-02-02 RX ADMIN — SODIUM BICARBONATE SCH MG: 650 TABLET ORAL at 17:45

## 2020-02-02 RX ADMIN — MEROPENEM SCH MLS/HR: 500 INJECTION INTRAVENOUS at 08:58

## 2020-02-02 RX ADMIN — DOCUSATE SODIUM SCH MG: 100 TABLET, FILM COATED ORAL at 17:00

## 2020-02-02 RX ADMIN — SODIUM BICARBONATE SCH MG: 650 TABLET ORAL at 08:58

## 2020-02-02 RX ADMIN — FAMOTIDINE SCH MG: 20 TABLET, FILM COATED ORAL at 07:30

## 2020-02-02 RX ADMIN — MEROPENEM SCH MLS/HR: 500 INJECTION INTRAVENOUS at 21:40

## 2020-02-02 RX ADMIN — LABETALOL HCL SCH MG: 100 TABLET, FILM COATED ORAL at 21:40

## 2020-02-02 RX ADMIN — SODIUM CHLORIDE TAB 1 GM SCH GM: 1 TAB at 17:44

## 2020-02-02 RX ADMIN — FAMOTIDINE SCH MG: 20 TABLET, FILM COATED ORAL at 17:44

## 2020-02-02 RX ADMIN — SODIUM CHLORIDE TAB 1 GM SCH GM: 1 TAB at 21:40

## 2020-02-02 RX ADMIN — DOCUSATE SODIUM SCH MG: 100 TABLET, FILM COATED ORAL at 08:58

## 2020-02-02 RX ADMIN — GUAIFENESIN AND DEXTROMETHORPHAN SCH ML: 100; 10 SYRUP ORAL at 05:30

## 2020-02-02 RX ADMIN — GUAIFENESIN AND DEXTROMETHORPHAN SCH ML: 100; 10 SYRUP ORAL at 21:49

## 2020-02-02 RX ADMIN — SODIUM CHLORIDE TAB 1 GM SCH GM: 1 TAB at 08:58

## 2020-02-02 NOTE — CONSULTATION
DATE OF CONSULTATION:  02/02/2020

 

Infectious Disease Initial Consultation 

 

CONSULTING PHYSICIANS:  

1. Juliocesar Conklin MD.

2. Lyndsey Norris MD.

 

REASON FOR CONSULTATION:  Management of IV antibiotics for E. coli ESBL urinary tract

infection as well as leukocytosis. 

 

HISTORY OF PRESENT ILLNESS:  This is a 64-year-old  male with past medical

history of hypertension and chronic tobacco abuse who presented to [facility] emergency department with complaints of constipation and lower abdominal

pain.  On 01/29/2020, he did have some complaints of additional nausea without any

vomiting.  He came to the emergency department for evaluation.  CT of the abdomen and

pelvis was done which was negative for any acute abdominal issues.  Urine culture was

positive for E coli ESBL.  The patient was started on meropenem on 01/31/2020.

Admission white count was elevated and has been trending down since initiation of

meropenem.  Also, the patient does have complaints today of some shortness of breath.

He has decreased breath sounds in bilateral bases.  Again, he is a chronic tobacco

smoker about a quarter pack per day.  Chest x-ray was done today that showed some

bilateral perihilar opacities suggestive of edema, atelectasis, and possible pneumonia.

The patient's O2 saturations have been okay, but oxygen had to be increased to 5 L per

nasal cannula this morning.  He has been afebrile.  Creatinine was 1.61 today. 

 

PAST MEDICAL HISTORY:  See HPI.

 

PAST SURGICAL HISTORY:  None.

 

ALLERGIES:  NO KNOWN DRUG ALLERGIES.

 

FAMILY HISTORY:  Unremarkable.

 

SOCIAL HISTORY:  He is .  Positive tobacco abuse.  One-fourth to one-half pack

per day of cigarettes.  No illicit drug use.  No ETOH abuse. 

 

MEDICATIONS:  See MAR.

 

REVIEW OF SYSTEMS:

A 14-point review of systems was conducted.  Positive for some lower back pain and mild

shortness of breath.  Otherwise, he denies any dysuria, nausea, vomiting, diarrhea, or

constipation at this time. 

 

PHYSICAL EXAMINATION:

VITAL SIGNS:  Currently, temperature 97.3, blood pressure 167/83, pulse 98, respirations

30, O2 saturation on 4 L per nasal cannula is 91. 

GENERAL:  He is awake, alert, and oriented x3, does not appear to be in any acute

distress. 

LUNGS:  With diminished sounds at bases.  Some rhonchi heard in right upper lobe. 

HEENT:  Head is normocephalic and atraumatic.  PERRLA.  Extraocular movements are

intact. 

NECK:  Supple.  No lymphadenopathy. 

CARDIOVASCULAR:  Regular rate and rhythm.  Normal S1, S2.  No murmurs audible. 

ABDOMEN:  Soft, nontender, nondistended.  Bowel sounds present x4. 

EXTREMITIES:  There is no cyanosis, clubbing, or edema. 

NEUROLOGICAL:  Nonfocal.  Cranial nerves 2 through 12 grossly intact.

LABORATORY AND DIAGNOSTIC DATA:  White count today is 16.9, hemoglobin 13.3, hematocrit

38.6, platelet count of 303.  Sodium 136, potassium 4.1, chloride 108, bicarb 18, BUN of

20, creatinine of 1.61, INR is 1.36.  Liver enzymes within normal limits. 

 

ASSESSMENT:  

1. E. coli ESBL urinary tract infection.

2. Leukocytosis.

3. Acute kidney injury.

4. Back pain.

 

PLAN AND RECOMMENDATIONS:  We will continue with meropenem.  Further recommendations to

follow based on the patient's clinical course.  Case was discussed with Dr. Norris as

well as Dr. Conklin. 

 

Thank you Dr. Conklin for the consultation.  We will continue to follow the patient along

with you. 

 

 

Dictated by Jake Foy NP

 

______________________________

MD AKHIL Gastelum/KEITH

D:  02/02/2020 11:59:12

T:  02/02/2020 19:33:30

Job #:  [id number]

## 2020-02-02 NOTE — NUR
Dr Conklin here for rounds, he aware about Chest X-ray result, new orders recvd for IV Lasix 
X1 , hold IV fluids for today, repeat BMP tomorrow.

## 2020-02-02 NOTE — NUR
IM- progress note

O/N see below

ROS: no f/c/s/SOB/cp/leg pain/no back pain/confusion/focal limb weakness.  



v/s; revd

PE:

tired appearing

anicteirc

ns1s2

mod bs

soft nd; mild tenderness lower abdomen

no e/t

skin dry

flat affect

a&ox3; marquez



labs/meds revd



A/P: 64yoM

Constipation

DEJON

Dehydration

ESBL E.coli UTI

Hyponatremia

Metabolic acidosis



PLAN

IVF; bicarbs; Bowel regimen; IV abx; f/u cx; Check labs in am.

SCD/pepcid



1-30 treat hyponatremia and dehydration and metabolic acidosis; 

1-31 ESBL E.coli UTI- merrem; IJ needed. 

2/1 check labs in am.

2/2 DEJON improving; control BP; SNF eval. ID eval; Hyponatremia- resolving; 
check labs; give 20 lasix once; hold fluids. 

Beata Conklin MD, PhD.

## 2020-02-02 NOTE — NUR
SNF ORDER RECEIVED. PATIENT SIGNED CHOICE FOR MED [facility]ORT [facility]. AWAITING INSURANCE AUTH. 
PATIENT CONTINUES ON IV ABX FOR ESBL

## 2020-02-02 NOTE — DIAGNOSTIC IMAGING REPORT
EXAMINATION:  CHEST SINGLE (PORTABLE)   



COMPARISON:  None



INDICATION: Shortness of breath 

^COUGH

^[id number]

^0540  



DISCUSSION:

Frontal view of the chest obtained at 0532 hours.



HEART AND MEDIASTINUM:  The heart is mildly enlarged.

  

LINES:  Right central line terminates in the SVC.



LUNGS:  Right infrahilar and left perihilar airspace opacities. Poor

visualization left diaphragm.



PLEURA:  Blunting of the left lateral costophrenic angle suggestive of pleural

effusion. No pneumothorax



BONES AND SOFT TISSUES: Degenerative changes of the spine. No focal osseous

lesion. The soft tissues are normal.





IMPRESSION: 

Right infrahilar and left perihilar airspace opacities are suggestive of edema,

atelectasis or pneumonia. Mild cardiomegaly. 



Signed by: Dr. Fabrice Rosa MD on 2/2/2020 6:05 AM

## 2020-02-03 VITALS — SYSTOLIC BLOOD PRESSURE: 141 MMHG | DIASTOLIC BLOOD PRESSURE: 68 MMHG

## 2020-02-03 VITALS — DIASTOLIC BLOOD PRESSURE: 81 MMHG | SYSTOLIC BLOOD PRESSURE: 145 MMHG

## 2020-02-03 VITALS — DIASTOLIC BLOOD PRESSURE: 77 MMHG | SYSTOLIC BLOOD PRESSURE: 134 MMHG

## 2020-02-03 VITALS — SYSTOLIC BLOOD PRESSURE: 155 MMHG | DIASTOLIC BLOOD PRESSURE: 87 MMHG

## 2020-02-03 VITALS — DIASTOLIC BLOOD PRESSURE: 88 MMHG | SYSTOLIC BLOOD PRESSURE: 146 MMHG

## 2020-02-03 VITALS — SYSTOLIC BLOOD PRESSURE: 158 MMHG | DIASTOLIC BLOOD PRESSURE: 77 MMHG

## 2020-02-03 VITALS — DIASTOLIC BLOOD PRESSURE: 77 MMHG | SYSTOLIC BLOOD PRESSURE: 158 MMHG

## 2020-02-03 VITALS — SYSTOLIC BLOOD PRESSURE: 146 MMHG | DIASTOLIC BLOOD PRESSURE: 88 MMHG

## 2020-02-03 LAB
ANION GAP SERPL CALC-SCNC: 14.8 MMOL/L (ref 8–16)
BUN SERPL-MCNC: 18 MG/DL (ref 7–26)
BUN/CREAT SERPL: 12 (ref 6–25)
CALCIUM SERPL-MCNC: 8.1 MG/DL (ref 8.4–10.2)
CHLORIDE SERPL-SCNC: 108 MMOL/L (ref 98–107)
CO2 SERPL-SCNC: 19 MMOL/L (ref 22–29)
EGFRCR SERPLBLD CKD-EPI 2021: 45 ML/MIN (ref 60–?)
GLUCOSE SERPLBLD-MCNC: 136 MG/DL (ref 74–118)
POTASSIUM SERPL-SCNC: 3.8 MMOL/L (ref 3.5–5.1)
SODIUM SERPL-SCNC: 138 MMOL/L (ref 136–145)

## 2020-02-03 RX ADMIN — LABETALOL HCL SCH MG: 100 TABLET, FILM COATED ORAL at 20:34

## 2020-02-03 RX ADMIN — DOCUSATE SODIUM SCH MG: 100 TABLET, FILM COATED ORAL at 16:23

## 2020-02-03 RX ADMIN — Medication SCH MG: at 16:23

## 2020-02-03 RX ADMIN — Medication SCH MG: at 07:30

## 2020-02-03 RX ADMIN — Medication SCH MG: at 09:08

## 2020-02-03 RX ADMIN — SODIUM CHLORIDE TAB 1 GM SCH GM: 1 TAB at 09:07

## 2020-02-03 RX ADMIN — SODIUM BICARBONATE SCH MG: 650 TABLET ORAL at 09:07

## 2020-02-03 RX ADMIN — FAMOTIDINE SCH MG: 20 TABLET, FILM COATED ORAL at 16:23

## 2020-02-03 RX ADMIN — SODIUM CHLORIDE TAB 1 GM SCH GM: 1 TAB at 20:34

## 2020-02-03 RX ADMIN — SODIUM BICARBONATE SCH MG: 650 TABLET ORAL at 16:23

## 2020-02-03 RX ADMIN — FAMOTIDINE SCH MG: 20 TABLET, FILM COATED ORAL at 09:07

## 2020-02-03 RX ADMIN — GUAIFENESIN AND DEXTROMETHORPHAN SCH ML: 100; 10 SYRUP ORAL at 05:33

## 2020-02-03 RX ADMIN — GUAIFENESIN AND DEXTROMETHORPHAN SCH ML: 100; 10 SYRUP ORAL at 14:00

## 2020-02-03 RX ADMIN — DOCUSATE SODIUM SCH MG: 100 TABLET, FILM COATED ORAL at 09:00

## 2020-02-03 RX ADMIN — LABETALOL HCL SCH MG: 100 TABLET, FILM COATED ORAL at 09:08

## 2020-02-03 RX ADMIN — SODIUM CHLORIDE TAB 1 GM SCH GM: 1 TAB at 14:00

## 2020-02-03 RX ADMIN — MEROPENEM SCH MLS/HR: 500 INJECTION INTRAVENOUS at 09:07

## 2020-02-03 RX ADMIN — GUAIFENESIN AND DEXTROMETHORPHAN SCH ML: 100; 10 SYRUP ORAL at 22:00

## 2020-02-03 RX ADMIN — MEROPENEM SCH MLS/HR: 500 INJECTION INTRAVENOUS at 20:34

## 2020-02-03 NOTE — NUR
IM- progress note

O/N see below

ROS: no f/c/s/SOB/cp/leg pain/no back pain/confusion/focal limb weakness.  



v/s; revd

PE:

tired appearing

anicteirc

ns1s2

mod bs

soft nd; mild tenderness lower abdomen

no e/t

skin dry

flat affect

a&ox3; marquez



labs/meds revd



A/P: 64yoM

Constipation

DEJON

Dehydration

ESBL E.coli UTI

Hyponatremia

Metabolic acidosis



PLAN

IVF; bicarbs; Bowel regimen; IV abx; f/u cx; Check labs in am.

SCD/pepcid



1-30 treat hyponatremia and dehydration and metabolic acidosis; 

1-31 ESBL E.coli UTI- merrem; IJ needed. 

2/1 check labs in am.

2/2 DEJON improving; control BP; SNF eval. ID eval; Hyponatremia- resolving; 
check labs; give 20 lasix once; hold fluids. 

2/3 check labs; renal U/S; titrate BB up for BP control



Beata Conklin MD, PhD.

## 2020-02-03 NOTE — NUR
PATIENT IS ALERT AND IN STABLE CONDITION WITH NO S/S OF RESPIRATORY DISTRESS. PATIENT DENIES 
PAIN. 02 APPLIED AT 3L NC. CALL LIGHT IS WITHIN REACH, PATIENT INSTRUCTED TO CALL FOR 
ASSISTANCE AS NEEDED.

## 2020-02-03 NOTE — NUR
PATIENT IS IN STABLE CONDITION WITH NO S/S OF RESPIRATORY DISTRESS. NO PAIN VOICED. 02 
APPLIED AT 3L NC. CALL LIGHT IS WITHIN REACH, PATIENT INSTRUCTED TO CALL FOR ASSISTANCE AS 
NEEDED. BEDSIDE SHIFT REPORT GIVEN TO ONCOMING NURSE.

## 2020-02-03 NOTE — DIAGNOSTIC IMAGING REPORT
EXAMINATION:  CHEST SINGLE (PORTABLE)    



INDICATION: Cough



COMPARISON: Chest radiograph 2/2/2020

     

FINDINGS:



LINES/TUBES:Right IJ tunneled central venous catheter terminates near the

superior cavoatrial junction.



LUNGS:The lungs are moderately inflated. Left lung base consolidation partially

silhouetting the left heart border and left hemidiaphragm.



PLEURA:Possible small left pleural effusion. No pneumothorax.



MEDIASTINUM:The cardiomediastinal silhouette appears unchanged in size and

shape. Atherosclerotic calcifications of the thoracic aorta.



BONES/SOFT TISSUES:No acute osseous injury.



ABDOMEN:No free air under the diaphragm.





IMPRESSION: 

Left lung base consolidation, concerning for aspiration and/or pneumonia.



RECOMMENDATIONS:

Lower PA and lateral chest radiograph in 6-8 weeks following treatment to

assess for resolution.



Signed by: Angelo Rolon MD on 2/3/2020 2:41 PM

## 2020-02-03 NOTE — DIAGNOSTIC IMAGING REPORT
EXAM: Renal Ultrasound

INDICATION:        

^jasbir vs ckd  

COMPARISON: None 

TECHNIQUE: Transverse and longitudinal images of the kidneys and bladder were

obtained.  



FINDINGS:     



Right Kidney: 

Length: 11.2 cm

Appearance: Normal echogenicity.  

Collecting system: No hydronephrosis

Stones: None

Cyst/Mass: Lower pole exophytic 2.5 x 2.9 x 2.6 cm anechoic simple cyst.



Left Kidney: 

Length: 10.8 cm

Appearance: Normal echogenicity.  

Collecting system: No hydronephrosis

Stones: None

Cyst/Mass: None



Bladder: 

No mass or calculi. Prevoid volume estimate of 71 cc. Bilateral ureteral jets

visualized.



The prostate is partially obscured by bowel gas and measures 2.2 x 1.8 x 2.2 cm

with volume estimate of 5 cc.



IMPRESSION:

No hydronephrosis or renal calculi.



Right lower pole simple cyst.



Signed by: Angelo Rolon MD on 2/3/2020 11:59 AM

## 2020-02-03 NOTE — NUR
patient received awake, alert, sitting up on side of bed. no c/o pain noted. respirations 
even and unlabored. 02/3l/nc in use. pm assessment complete. patient instructed to call for 
assistance when needed.

## 2020-02-04 VITALS — DIASTOLIC BLOOD PRESSURE: 84 MMHG | SYSTOLIC BLOOD PRESSURE: 176 MMHG

## 2020-02-04 VITALS — SYSTOLIC BLOOD PRESSURE: 182 MMHG | DIASTOLIC BLOOD PRESSURE: 84 MMHG

## 2020-02-04 VITALS — DIASTOLIC BLOOD PRESSURE: 87 MMHG | SYSTOLIC BLOOD PRESSURE: 170 MMHG

## 2020-02-04 VITALS — SYSTOLIC BLOOD PRESSURE: 159 MMHG | DIASTOLIC BLOOD PRESSURE: 82 MMHG

## 2020-02-04 VITALS — DIASTOLIC BLOOD PRESSURE: 76 MMHG | SYSTOLIC BLOOD PRESSURE: 138 MMHG

## 2020-02-04 LAB
ANION GAP SERPL CALC-SCNC: 13.6 MMOL/L (ref 8–16)
BUN SERPL-MCNC: 17 MG/DL (ref 7–26)
BUN/CREAT SERPL: 11 (ref 6–25)
CALCIUM SERPL-MCNC: 7.9 MG/DL (ref 8.4–10.2)
CHLORIDE SERPL-SCNC: 111 MMOL/L (ref 98–107)
CO2 SERPL-SCNC: 18 MMOL/L (ref 22–29)
EGFRCR SERPLBLD CKD-EPI 2021: 46 ML/MIN (ref 60–?)
ERYTHROCYTE [DISTWIDTH] IN CORD BLOOD: 12.6 % (ref 11.7–14.4)
GLUCOSE SERPLBLD-MCNC: 131 MG/DL (ref 74–118)
HCT VFR BLD AUTO: 37 % (ref 38.2–49.6)
HGB BLD-MCNC: 13.3 G/DL (ref 14–18)
MCH RBC QN AUTO: 33.3 PG (ref 28–32)
MCHC RBC AUTO-ENTMCNC: 35.9 G/DL (ref 31–35)
MCV RBC AUTO: 92.7 FL (ref 81–99)
PLATELET # BLD AUTO: 278 X10E3/UL (ref 140–360)
POTASSIUM SERPL-SCNC: 3.6 MMOL/L (ref 3.5–5.1)
RBC # BLD AUTO: 3.99 X10E6/UL (ref 4.3–5.7)
SODIUM SERPL-SCNC: 139 MMOL/L (ref 136–145)

## 2020-02-04 RX ADMIN — SODIUM BICARBONATE SCH MG: 650 TABLET ORAL at 17:34

## 2020-02-04 RX ADMIN — GUAIFENESIN AND DEXTROMETHORPHAN SCH ML: 100; 10 SYRUP ORAL at 05:22

## 2020-02-04 RX ADMIN — SODIUM CHLORIDE TAB 1 GM SCH GM: 1 TAB at 14:22

## 2020-02-04 RX ADMIN — Medication SCH MG: at 16:30

## 2020-02-04 RX ADMIN — SODIUM CHLORIDE TAB 1 GM SCH GM: 1 TAB at 20:40

## 2020-02-04 RX ADMIN — SODIUM BICARBONATE SCH MG: 650 TABLET ORAL at 08:28

## 2020-02-04 RX ADMIN — SODIUM CHLORIDE TAB 1 GM SCH GM: 1 TAB at 08:28

## 2020-02-04 RX ADMIN — FAMOTIDINE SCH MG: 20 TABLET, FILM COATED ORAL at 17:34

## 2020-02-04 RX ADMIN — LABETALOL HCL SCH MG: 100 TABLET, FILM COATED ORAL at 20:40

## 2020-02-04 RX ADMIN — MEROPENEM SCH MLS/HR: 500 INJECTION INTRAVENOUS at 20:40

## 2020-02-04 RX ADMIN — MEROPENEM SCH MLS/HR: 500 INJECTION INTRAVENOUS at 08:31

## 2020-02-04 RX ADMIN — Medication SCH MG: at 08:33

## 2020-02-04 RX ADMIN — DOCUSATE SODIUM SCH MG: 100 TABLET, FILM COATED ORAL at 17:00

## 2020-02-04 RX ADMIN — Medication SCH MG: at 07:30

## 2020-02-04 RX ADMIN — GUAIFENESIN AND DEXTROMETHORPHAN SCH ML: 100; 10 SYRUP ORAL at 14:22

## 2020-02-04 RX ADMIN — DOCUSATE SODIUM SCH MG: 100 TABLET, FILM COATED ORAL at 08:23

## 2020-02-04 RX ADMIN — FAMOTIDINE SCH MG: 20 TABLET, FILM COATED ORAL at 08:31

## 2020-02-04 RX ADMIN — LABETALOL HCL SCH MG: 100 TABLET, FILM COATED ORAL at 08:33

## 2020-02-04 RX ADMIN — GUAIFENESIN AND DEXTROMETHORPHAN SCH ML: 100; 10 SYRUP ORAL at 21:18

## 2020-02-04 NOTE — NUR
IM- progress note

O/N see below

ROS: no f/c/s/SOB/cp/leg pain/no back pain/confusion/focal limb weakness.  



v/s; revd

PE:

tired appearing

anicteirc

ns1s2

mod bs

soft nd; mild tenderness lower abdomen

no e/t

skin dry

flat affect

a&ox3; marquez



labs/meds revd



A/P: 64yoM

Constipation

DEJON

Dehydration

ESBL E.coli UTI

Hyponatremia

Metabolic acidosis



PLAN

IVF; bicarbs; Bowel regimen; IV abx; f/u cx; Check labs in am.

SCD/pepcid



1-30 treat hyponatremia and dehydration and metabolic acidosis; 

1-31 ESBL E.coli UTI- merrem; IJ needed. 

2/1 check labs in am.

2/2 DEJON improving; control BP; SNF eval. ID eval; Hyponatremia- resolving; 
check labs; give 20 lasix once; hold fluids. 

2/3 check labs; renal U/S; titrate BB up for BP control

2/4 Left PNA- likely POA, as infiltrates present on prior CXR; cont abx per ID. 
SNF pending; 



Beata Conklin MD, PhD.

## 2020-02-04 NOTE — NUR
PATIENT RESTING BACK IN THE BED- IN STABLE CONDITION WITH NO S/S OF RESPIRATORY DISTRESS. NO 
PAIN VOICED. CALL LIGHT IS WITHIN REACH- PATIENT INSTRUCTED TO CALL FOR ASSISTANCE AS 
NEEDED. BEDSIDE SHIFT REPORT GIVEN TO ONCOMING NURSE.

## 2020-02-04 NOTE — NUR
PATIENT IS SLEEPING BUT EASILY AWAKES WITH SOUND. PATIENT IN STABLE CONDITION WITH NO S/S OF 
RESPIRATORY DISTRESS. NO PAIN VOICED. 02 APPLIED AT 4L NC. CALL LIGHT IS WITHIN REACH, 
PATIENT INSTRUCTED TO CALL FOR ASSISTANCE AS NEEDED.

## 2020-02-04 NOTE — NUR
DR. LIVINGSTON INFORMED OF PATIENT'S SEPSIS SCREEN INDICATING SIRS ALERT THIS MORNING- NO NEW 
ORDERS RECEIVED.

## 2020-02-05 VITALS — SYSTOLIC BLOOD PRESSURE: 173 MMHG | DIASTOLIC BLOOD PRESSURE: 82 MMHG

## 2020-02-05 VITALS — SYSTOLIC BLOOD PRESSURE: 167 MMHG | DIASTOLIC BLOOD PRESSURE: 91 MMHG

## 2020-02-05 VITALS — DIASTOLIC BLOOD PRESSURE: 67 MMHG | SYSTOLIC BLOOD PRESSURE: 133 MMHG

## 2020-02-05 VITALS — SYSTOLIC BLOOD PRESSURE: 162 MMHG | DIASTOLIC BLOOD PRESSURE: 75 MMHG

## 2020-02-05 LAB
ANION GAP SERPL CALC-SCNC: 13.3 MMOL/L (ref 8–16)
BASOPHILS # BLD AUTO: 0.1 10*3/UL (ref 0–0.1)
BASOPHILS NFR BLD AUTO: 0.7 % (ref 0–1)
BUN SERPL-MCNC: 17 MG/DL (ref 7–26)
BUN/CREAT SERPL: 10 (ref 6–25)
CALCIUM SERPL-MCNC: 7.7 MG/DL (ref 8.4–10.2)
CHLORIDE SERPL-SCNC: 108 MMOL/L (ref 98–107)
CO2 SERPL-SCNC: 22 MMOL/L (ref 22–29)
DEPRECATED NEUTROPHILS # BLD AUTO: 9 10*3/UL (ref 2.1–6.9)
EGFRCR SERPLBLD CKD-EPI 2021: 39 ML/MIN (ref 60–?)
EOSINOPHIL # BLD AUTO: 0.3 10*3/UL (ref 0–0.4)
EOSINOPHIL NFR BLD AUTO: 2.8 % (ref 0–6)
ERYTHROCYTE [DISTWIDTH] IN CORD BLOOD: 12.6 % (ref 11.7–14.4)
GLUCOSE SERPLBLD-MCNC: 152 MG/DL (ref 74–118)
HCT VFR BLD AUTO: 36.3 % (ref 38.2–49.6)
HGB BLD-MCNC: 12.8 G/DL (ref 14–18)
LYMPHOCYTES # BLD: 1.2 10*3/UL (ref 1–3.2)
LYMPHOCYTES NFR BLD AUTO: 10.5 % (ref 18–39.1)
MCH RBC QN AUTO: 32.8 PG (ref 28–32)
MCHC RBC AUTO-ENTMCNC: 35.3 G/DL (ref 31–35)
MCV RBC AUTO: 93.1 FL (ref 81–99)
MONOCYTES # BLD AUTO: 0.9 10*3/UL (ref 0.2–0.8)
MONOCYTES NFR BLD AUTO: 7.4 % (ref 4.4–11.3)
NEUTS SEG NFR BLD AUTO: 77.7 % (ref 38.7–80)
PLATELET # BLD AUTO: 266 X10E3/UL (ref 140–360)
POTASSIUM SERPL-SCNC: 3.3 MMOL/L (ref 3.5–5.1)
RBC # BLD AUTO: 3.9 X10E6/UL (ref 4.3–5.7)
SODIUM SERPL-SCNC: 140 MMOL/L (ref 136–145)

## 2020-02-05 RX ADMIN — LABETALOL HCL SCH MG: 100 TABLET, FILM COATED ORAL at 08:43

## 2020-02-05 RX ADMIN — Medication SCH MG: at 08:42

## 2020-02-05 RX ADMIN — GUAIFENESIN AND DEXTROMETHORPHAN SCH ML: 100; 10 SYRUP ORAL at 05:35

## 2020-02-05 RX ADMIN — Medication SCH MG: at 08:41

## 2020-02-05 RX ADMIN — SODIUM CHLORIDE TAB 1 GM SCH GM: 1 TAB at 08:41

## 2020-02-05 RX ADMIN — FAMOTIDINE SCH MG: 20 TABLET, FILM COATED ORAL at 08:40

## 2020-02-05 RX ADMIN — DOCUSATE SODIUM SCH MG: 100 TABLET, FILM COATED ORAL at 08:41

## 2020-02-05 RX ADMIN — MEROPENEM SCH MLS/HR: 500 INJECTION INTRAVENOUS at 08:41

## 2020-02-05 RX ADMIN — SODIUM BICARBONATE SCH MG: 650 TABLET ORAL at 08:41

## 2020-02-05 NOTE — NUR
SKILLED NURSING FACILITY DISCHARGE INFORMATION 

PATIENT HAS BEEN ACCEPTED TO: [facility]

NAME:[facility]

ADDRESS:[address] 



ACCEPTING MD: YARA

ROOM: [id number]

NURSE CALL REPORT TO: [phone]

IMM SIGNED AND OBTAINED (if applicable): YES

THE FOLLOWING DOCUMENTS MUST ACCOMPANY PATIENT FOR TRANSFER:

COPIED CHART: PACKET

## 2020-02-05 NOTE — NUR
D/C SUmmary'



Principal Dx:

Constipation

DEJON

Dehydration

ESBL E.coli UTI

Hyponatremia

Metabolic acidosis



Secondary Dx:



PLAN

IVF; bicarbs; Bowel regimen; IV abx; f/u cx; Check labs in am.

SCD/pepcid



1-30 treat hyponatremia and dehydration and metabolic acidosis; 

1-31 ESBL E.coli UTI- merrem; IJ needed. 

2/1 check labs in am.

2/2 DEJON improving; control BP; SNF eval. ID eval; Hyponatremia- resolving; 
check labs; give 20 lasix once; hold fluids. 

2/3 check labs; renal U/S; titrate BB up for BP control

2/4 Left PNA- likely POA, as infiltrates present on prior CXR; cont abx per ID. 
SNF pending; 

2/5 check labs; 



d/c to SNF

stable

f/u pcp 1 week

d/c>35mins



Beata Conklin MD, PhD.

## 2020-02-18 ENCOUNTER — HOSPITAL ENCOUNTER (EMERGENCY)
Dept: HOSPITAL 88 - ER | Age: 65
Discharge: HOME | End: 2020-02-18
Payer: MEDICARE

## 2020-02-18 VITALS — BODY MASS INDEX: 27.46 KG/M2 | HEIGHT: 63 IN | WEIGHT: 155 LBS

## 2020-02-18 VITALS — DIASTOLIC BLOOD PRESSURE: 76 MMHG | SYSTOLIC BLOOD PRESSURE: 144 MMHG

## 2020-02-18 DIAGNOSIS — Z45.2: Primary | ICD-10-CM

## 2020-02-18 PROCEDURE — 99281 EMR DPT VST MAYX REQ PHY/QHP: CPT

## 2020-02-18 PROCEDURE — 74470 X-RAY EXAM OF KIDNEY LESION: CPT

## 2020-02-18 PROCEDURE — 36589 REMOVAL TUNNELED CV CATH: CPT

## 2020-02-18 NOTE — DIAGNOSTIC IMAGING REPORT
PROCEDURE: Tunneled central venous catheter removal



Procedural Personnel

Attending physician(s): Angelo Rolon MD

Fellow physician(s): None

Resident physician(s): None

Advanced practice provider(s): None



Pre-procedure diagnosis: Urinary tract infection

Post-procedure diagnosis: Same

Indication: Catheter no longer needed

Additional clinical history: None



Complications: No immediate complications.



IMPRESSION:



Removal of right-sided tunneled central venous catheter.



Plan: 



Please re-consult interventional radiology if new catheter placement is

desired.

_______________________________________________________________



PROCEDURE SUMMARY:

- Tunneled central venous catheter removal

- Additional procedure(s): None



PROCEDURE DETAILS:



Pre-procedure

Consent: Informed consent for the procedure including risks, benefits and

alternatives was obtained and time-out was performed prior to the procedure.

Preparation: The site was prepared and draped using maximal sterile barrier

technique including cutaneous antisepsis.



Anesthesia/sedation

Level of anesthesia/sedation: No sedation

Anesthesia/sedation administered by: Not applicable

Total intra-service sedation time (minutes): N/A



Catheter removal

Local anesthesia was administered. The catheter was removed with traction.



Closure

Hemostasis was achieved with manual compression. Sterile dressing(s) applied.



Contrast

Contrast agent: None

Contrast volume (mL): N/A



Additional Details

Additional description of procedure: None

Equipment details: None

Specimens removed: Tunneled central venous catheter. 

Estimated blood loss (mL): Less than 10

Standardized report: [email or web address]



Attestation

Signer name: Angelo Rolon MD

I attest that I was present for the entire procedure. I reviewed the stored

images and agree with the report as written.



Signed by: Angelo Rolon MD on 2/18/2020 3:22 PM

## 2020-02-18 NOTE — DIAGNOSTIC IMAGING REPORT
PROCEDURE: Tunneled central venous catheter removal



Procedural Personnel

Attending physician(s): Angelo Rolno MD

Fellow physician(s): None

Resident physician(s): None

Advanced practice provider(s): None



Pre-procedure diagnosis: Urinary tract infection

Post-procedure diagnosis: Same

Indication: Catheter no longer needed

Additional clinical history: None



Complications: No immediate complications.



IMPRESSION:



Removal of right-sided tunneled central venous catheter.



Plan: 



Please re-consult interventional radiology if new catheter placement is

desired.

_______________________________________________________________



PROCEDURE SUMMARY:

- Tunneled central venous catheter removal

- Additional procedure(s): None



PROCEDURE DETAILS:



Pre-procedure

Consent: Informed consent for the procedure including risks, benefits and

alternatives was obtained and time-out was performed prior to the procedure.

Preparation: The site was prepared and draped using maximal sterile barrier

technique including cutaneous antisepsis.



Anesthesia/sedation

Level of anesthesia/sedation: No sedation

Anesthesia/sedation administered by: Not applicable

Total intra-service sedation time (minutes): N/A



Catheter removal

Local anesthesia was administered. The catheter was removed with traction.



Closure

Hemostasis was achieved with manual compression. Sterile dressing(s) applied.



Contrast

Contrast agent: None

Contrast volume (mL): N/A



Additional Details

Additional description of procedure: None

Equipment details: None

Specimens removed: Tunneled central venous catheter. 

Estimated blood loss (mL): Less than 10

Standardized report: [email or web address]



Attestation

Signer name: Angelo Rolon MD

I attest that I was present for the entire procedure. I reviewed the stored

images and agree with the report as written.



Signed by: Angelo Rolon MD on 2/18/2020 3:22 PM

## 2022-10-11 ENCOUNTER — HOSPITAL ENCOUNTER (INPATIENT)
Dept: HOSPITAL 88 - FSED | Age: 67
LOS: 4 days | Discharge: HOME | DRG: 871 | End: 2022-10-15
Attending: INTERNAL MEDICINE | Admitting: INTERNAL MEDICINE
Payer: MEDICARE

## 2022-10-11 VITALS — SYSTOLIC BLOOD PRESSURE: 145 MMHG | DIASTOLIC BLOOD PRESSURE: 92 MMHG

## 2022-10-11 VITALS — SYSTOLIC BLOOD PRESSURE: 150 MMHG | DIASTOLIC BLOOD PRESSURE: 105 MMHG

## 2022-10-11 VITALS — HEIGHT: 65 IN | BODY MASS INDEX: 23.34 KG/M2 | WEIGHT: 140.06 LBS

## 2022-10-11 VITALS — SYSTOLIC BLOOD PRESSURE: 135 MMHG | DIASTOLIC BLOOD PRESSURE: 77 MMHG

## 2022-10-11 VITALS — DIASTOLIC BLOOD PRESSURE: 119 MMHG | SYSTOLIC BLOOD PRESSURE: 149 MMHG

## 2022-10-11 VITALS — DIASTOLIC BLOOD PRESSURE: 81 MMHG | SYSTOLIC BLOOD PRESSURE: 133 MMHG

## 2022-10-11 VITALS — DIASTOLIC BLOOD PRESSURE: 79 MMHG | SYSTOLIC BLOOD PRESSURE: 138 MMHG

## 2022-10-11 VITALS — SYSTOLIC BLOOD PRESSURE: 128 MMHG | DIASTOLIC BLOOD PRESSURE: 72 MMHG

## 2022-10-11 VITALS — DIASTOLIC BLOOD PRESSURE: 80 MMHG | SYSTOLIC BLOOD PRESSURE: 128 MMHG

## 2022-10-11 VITALS — SYSTOLIC BLOOD PRESSURE: 164 MMHG | DIASTOLIC BLOOD PRESSURE: 105 MMHG

## 2022-10-11 VITALS — SYSTOLIC BLOOD PRESSURE: 162 MMHG | DIASTOLIC BLOOD PRESSURE: 97 MMHG

## 2022-10-11 VITALS — DIASTOLIC BLOOD PRESSURE: 79 MMHG | SYSTOLIC BLOOD PRESSURE: 136 MMHG

## 2022-10-11 VITALS — SYSTOLIC BLOOD PRESSURE: 147 MMHG | DIASTOLIC BLOOD PRESSURE: 93 MMHG

## 2022-10-11 DIAGNOSIS — F17.210: ICD-10-CM

## 2022-10-11 DIAGNOSIS — Z53.21: ICD-10-CM

## 2022-10-11 DIAGNOSIS — R65.21: ICD-10-CM

## 2022-10-11 DIAGNOSIS — I16.1: ICD-10-CM

## 2022-10-11 DIAGNOSIS — I50.23: ICD-10-CM

## 2022-10-11 DIAGNOSIS — Z82.49: ICD-10-CM

## 2022-10-11 DIAGNOSIS — A41.9: Primary | ICD-10-CM

## 2022-10-11 DIAGNOSIS — Z91.199: ICD-10-CM

## 2022-10-11 DIAGNOSIS — J10.00: ICD-10-CM

## 2022-10-11 DIAGNOSIS — Z53.29: ICD-10-CM

## 2022-10-11 DIAGNOSIS — I21.4: ICD-10-CM

## 2022-10-11 DIAGNOSIS — M19.011: ICD-10-CM

## 2022-10-11 DIAGNOSIS — N17.9: ICD-10-CM

## 2022-10-11 DIAGNOSIS — I13.0: ICD-10-CM

## 2022-10-11 DIAGNOSIS — I25.10: ICD-10-CM

## 2022-10-11 DIAGNOSIS — N18.30: ICD-10-CM

## 2022-10-11 LAB
ALBUMIN SERPL-MCNC: 3.3 G/DL (ref 3.5–5)
ALBUMIN/GLOB SERPL: 0.8 {RATIO} (ref 0.8–2)
ALP SERPL-CCNC: 62 IU/L (ref 40–150)
ALT SERPL-CCNC: 22 IU/L (ref 0–55)
ANION GAP SERPL CALC-SCNC: 16.3 MMOL/L (ref 8–16)
BASOPHILS # BLD AUTO: 0 10*3/UL (ref 0–0.1)
BASOPHILS NFR BLD AUTO: 0.5 % (ref 0–1)
BUN SERPL-MCNC: 25 MG/DL (ref 7–26)
BUN/CREAT SERPL: 12 (ref 6–25)
CALCIUM SERPL-MCNC: 8.4 MG/DL (ref 8.4–10.2)
CHLORIDE SERPL-SCNC: 110 MMOL/L (ref 98–107)
CK MB SERPL-MCNC: 17.2 NG/ML (ref 0–5)
CK MB SERPL-MCNC: 18.5 NG/ML (ref 0–5)
CK SERPL-CCNC: 492 IU/L (ref 30–200)
CK SERPL-CCNC: 553 IU/L (ref 30–200)
CO2 SERPL-SCNC: 15 MMOL/L (ref 22–29)
DEPRECATED NEUTROPHILS # BLD AUTO: 7.3 10*3/UL (ref 2.1–6.9)
EOSINOPHIL # BLD AUTO: 0 10*3/UL (ref 0–0.4)
EOSINOPHIL NFR BLD AUTO: 0 % (ref 0–6)
ERYTHROCYTE [DISTWIDTH] IN CORD BLOOD: 12.3 % (ref 11.7–14.4)
GLOBULIN PLAS-MCNC: 3.9 G/DL (ref 2.3–3.5)
GLUCOSE SERPLBLD-MCNC: 134 MG/DL (ref 74–118)
HCT VFR BLD AUTO: 46.2 % (ref 38.2–49.6)
HGB BLD-MCNC: 15.5 G/DL (ref 14–18)
LYMPHOCYTES # BLD: 0.1 10*3/UL (ref 1–3.2)
LYMPHOCYTES NFR BLD AUTO: 1.7 % (ref 18–39.1)
MCH RBC QN AUTO: 33.6 PG (ref 28–32)
MCHC RBC AUTO-ENTMCNC: 33.5 G/DL (ref 31–35)
MCV RBC AUTO: 100.2 FL (ref 81–99)
MONOCYTES # BLD AUTO: 0.2 10*3/UL (ref 0.2–0.8)
MONOCYTES NFR BLD AUTO: 2.6 % (ref 4.4–11.3)
NEUTS SEG NFR BLD AUTO: 94.7 % (ref 38.7–80)
PLATELET # BLD AUTO: 173 X10E3/UL (ref 140–360)
POTASSIUM SERPL-SCNC: 4.3 MMOL/L (ref 3.5–5.1)
RBC # BLD AUTO: 4.61 X10E6/UL (ref 4.3–5.7)
SODIUM SERPL-SCNC: 137 MMOL/L (ref 136–145)

## 2022-10-11 PROCEDURE — 84100 ASSAY OF PHOSPHORUS: CPT

## 2022-10-11 PROCEDURE — 84484 ASSAY OF TROPONIN QUANT: CPT

## 2022-10-11 PROCEDURE — 4A023N7 MEASUREMENT OF CARDIAC SAMPLING AND PRESSURE, LEFT HEART, PERCUTANEOUS APPROACH: ICD-10-PCS | Performed by: INTERNAL MEDICINE

## 2022-10-11 PROCEDURE — 83880 ASSAY OF NATRIURETIC PEPTIDE: CPT

## 2022-10-11 PROCEDURE — 76937 US GUIDE VASCULAR ACCESS: CPT

## 2022-10-11 PROCEDURE — 93306 TTE W/DOPPLER COMPLETE: CPT

## 2022-10-11 PROCEDURE — 85730 THROMBOPLASTIN TIME PARTIAL: CPT

## 2022-10-11 PROCEDURE — 94799 UNLISTED PULMONARY SVC/PX: CPT

## 2022-10-11 PROCEDURE — 71045 X-RAY EXAM CHEST 1 VIEW: CPT

## 2022-10-11 PROCEDURE — 82553 CREATINE MB FRACTION: CPT

## 2022-10-11 PROCEDURE — 85025 COMPLETE CBC W/AUTO DIFF WBC: CPT

## 2022-10-11 PROCEDURE — 80053 COMPREHEN METABOLIC PANEL: CPT

## 2022-10-11 PROCEDURE — 82550 ASSAY OF CK (CPK): CPT

## 2022-10-11 PROCEDURE — 87040 BLOOD CULTURE FOR BACTERIA: CPT

## 2022-10-11 PROCEDURE — 83735 ASSAY OF MAGNESIUM: CPT

## 2022-10-11 PROCEDURE — 3E03329 INTRODUCTION OF OTHER ANTI-INFECTIVE INTO PERIPHERAL VEIN, PERCUTANEOUS APPROACH: ICD-10-PCS | Performed by: INTERNAL MEDICINE

## 2022-10-11 PROCEDURE — 80061 LIPID PANEL: CPT

## 2022-10-11 PROCEDURE — 99153 MOD SED SAME PHYS/QHP EA: CPT

## 2022-10-11 PROCEDURE — 87400 INFLUENZA A/B EACH AG IA: CPT

## 2022-10-11 PROCEDURE — 80048 BASIC METABOLIC PNL TOTAL CA: CPT

## 2022-10-11 PROCEDURE — 99152 MOD SED SAME PHYS/QHP 5/>YRS: CPT

## 2022-10-11 PROCEDURE — 83605 ASSAY OF LACTIC ACID: CPT

## 2022-10-11 PROCEDURE — 99284 EMERGENCY DEPT VISIT MOD MDM: CPT

## 2022-10-11 PROCEDURE — 36415 COLL VENOUS BLD VENIPUNCTURE: CPT

## 2022-10-11 PROCEDURE — 94640 AIRWAY INHALATION TREATMENT: CPT

## 2022-10-11 PROCEDURE — 83518 IMMUNOASSAY DIPSTICK: CPT

## 2022-10-11 PROCEDURE — 93005 ELECTROCARDIOGRAM TRACING: CPT

## 2022-10-11 PROCEDURE — B2151ZZ FLUOROSCOPY OF LEFT HEART USING LOW OSMOLAR CONTRAST: ICD-10-PCS | Performed by: INTERNAL MEDICINE

## 2022-10-11 PROCEDURE — B2111ZZ FLUOROSCOPY OF MULTIPLE CORONARY ARTERIES USING LOW OSMOLAR CONTRAST: ICD-10-PCS | Performed by: INTERNAL MEDICINE

## 2022-10-11 PROCEDURE — 93458 L HRT ARTERY/VENTRICLE ANGIO: CPT

## 2022-10-11 PROCEDURE — 83036 HEMOGLOBIN GLYCOSYLATED A1C: CPT

## 2022-10-11 RX ADMIN — ENOXAPARIN SODIUM SCH MG: 80 INJECTION SUBCUTANEOUS at 09:57

## 2022-10-11 RX ADMIN — IPRATROPIUM BROMIDE AND ALBUTEROL SULFATE PRN ML: .5; 2.5 SOLUTION RESPIRATORY (INHALATION) at 20:10

## 2022-10-11 RX ADMIN — METOPROLOL SUCCINATE SCH MG: 25 TABLET, EXTENDED RELEASE ORAL at 20:58

## 2022-10-11 RX ADMIN — Medication SCH MG: at 20:58

## 2022-10-12 VITALS — DIASTOLIC BLOOD PRESSURE: 89 MMHG | SYSTOLIC BLOOD PRESSURE: 134 MMHG

## 2022-10-12 VITALS — SYSTOLIC BLOOD PRESSURE: 154 MMHG | DIASTOLIC BLOOD PRESSURE: 108 MMHG

## 2022-10-12 VITALS — SYSTOLIC BLOOD PRESSURE: 127 MMHG | DIASTOLIC BLOOD PRESSURE: 84 MMHG

## 2022-10-12 VITALS — DIASTOLIC BLOOD PRESSURE: 101 MMHG | SYSTOLIC BLOOD PRESSURE: 172 MMHG

## 2022-10-12 VITALS — SYSTOLIC BLOOD PRESSURE: 134 MMHG | DIASTOLIC BLOOD PRESSURE: 86 MMHG

## 2022-10-12 VITALS — SYSTOLIC BLOOD PRESSURE: 150 MMHG | DIASTOLIC BLOOD PRESSURE: 95 MMHG

## 2022-10-12 VITALS — DIASTOLIC BLOOD PRESSURE: 128 MMHG | SYSTOLIC BLOOD PRESSURE: 151 MMHG

## 2022-10-12 VITALS — SYSTOLIC BLOOD PRESSURE: 141 MMHG | DIASTOLIC BLOOD PRESSURE: 88 MMHG

## 2022-10-12 VITALS — SYSTOLIC BLOOD PRESSURE: 139 MMHG | DIASTOLIC BLOOD PRESSURE: 99 MMHG

## 2022-10-12 VITALS — DIASTOLIC BLOOD PRESSURE: 81 MMHG | SYSTOLIC BLOOD PRESSURE: 139 MMHG

## 2022-10-12 VITALS — SYSTOLIC BLOOD PRESSURE: 135 MMHG | DIASTOLIC BLOOD PRESSURE: 86 MMHG

## 2022-10-12 VITALS — DIASTOLIC BLOOD PRESSURE: 96 MMHG | SYSTOLIC BLOOD PRESSURE: 156 MMHG

## 2022-10-12 VITALS — DIASTOLIC BLOOD PRESSURE: 89 MMHG | SYSTOLIC BLOOD PRESSURE: 118 MMHG

## 2022-10-12 VITALS — DIASTOLIC BLOOD PRESSURE: 78 MMHG | SYSTOLIC BLOOD PRESSURE: 131 MMHG

## 2022-10-12 VITALS — SYSTOLIC BLOOD PRESSURE: 141 MMHG | DIASTOLIC BLOOD PRESSURE: 87 MMHG

## 2022-10-12 VITALS — SYSTOLIC BLOOD PRESSURE: 133 MMHG | DIASTOLIC BLOOD PRESSURE: 116 MMHG

## 2022-10-12 VITALS — SYSTOLIC BLOOD PRESSURE: 154 MMHG | DIASTOLIC BLOOD PRESSURE: 89 MMHG

## 2022-10-12 VITALS — DIASTOLIC BLOOD PRESSURE: 83 MMHG | SYSTOLIC BLOOD PRESSURE: 143 MMHG

## 2022-10-12 VITALS — DIASTOLIC BLOOD PRESSURE: 91 MMHG | SYSTOLIC BLOOD PRESSURE: 133 MMHG

## 2022-10-12 VITALS — DIASTOLIC BLOOD PRESSURE: 101 MMHG | SYSTOLIC BLOOD PRESSURE: 135 MMHG

## 2022-10-12 VITALS — DIASTOLIC BLOOD PRESSURE: 86 MMHG | SYSTOLIC BLOOD PRESSURE: 140 MMHG

## 2022-10-12 VITALS — SYSTOLIC BLOOD PRESSURE: 159 MMHG | DIASTOLIC BLOOD PRESSURE: 101 MMHG

## 2022-10-12 VITALS — DIASTOLIC BLOOD PRESSURE: 86 MMHG | SYSTOLIC BLOOD PRESSURE: 158 MMHG

## 2022-10-12 VITALS — DIASTOLIC BLOOD PRESSURE: 92 MMHG | SYSTOLIC BLOOD PRESSURE: 149 MMHG

## 2022-10-12 VITALS — DIASTOLIC BLOOD PRESSURE: 91 MMHG | SYSTOLIC BLOOD PRESSURE: 135 MMHG

## 2022-10-12 LAB
ANION GAP SERPL CALC-SCNC: 17.5 MMOL/L (ref 8–16)
BASOPHILS # BLD AUTO: 0 10*3/UL (ref 0–0.1)
BASOPHILS NFR BLD AUTO: 0.2 % (ref 0–1)
BUN SERPL-MCNC: 37 MG/DL (ref 7–26)
BUN/CREAT SERPL: 16 (ref 6–25)
CALCIUM SERPL-MCNC: 9.2 MG/DL (ref 8.4–10.2)
CHLORIDE SERPL-SCNC: 111 MMOL/L (ref 98–107)
CHOLEST SERPL-MCNC: 169 MD/DL (ref 0–199)
CHOLEST/HDLC SERPL: 2.6 {RATIO} (ref 3.9–4.7)
CK MB SERPL-MCNC: 18.8 NG/ML (ref 0–5)
CK SERPL-CCNC: 423 IU/L (ref 30–200)
CO2 SERPL-SCNC: 14 MMOL/L (ref 22–29)
DEPRECATED NEUTROPHILS # BLD AUTO: 9.2 10*3/UL (ref 2.1–6.9)
DEPRECATED PHOSPHATE SERPL-MCNC: 4.2 MG/DL (ref 2.3–4.7)
EOSINOPHIL # BLD AUTO: 0 10*3/UL (ref 0–0.4)
EOSINOPHIL NFR BLD AUTO: 0 % (ref 0–6)
ERYTHROCYTE [DISTWIDTH] IN CORD BLOOD: 12.2 % (ref 11.7–14.4)
GLUCOSE SERPLBLD-MCNC: 130 MG/DL (ref 74–118)
HCT VFR BLD AUTO: 50.1 % (ref 38.2–49.6)
HDLC SERPL-MSCNC: 64 MG/DL (ref 40–60)
HGB BLD-MCNC: 16.7 G/DL (ref 14–18)
LDLC SERPL CALC-MCNC: 86 MG/DL (ref 60–130)
LYMPHOCYTES # BLD: 0.3 10*3/UL (ref 1–3.2)
LYMPHOCYTES NFR BLD AUTO: 3.2 % (ref 18–39.1)
MAGNESIUM SERPL-MCNC: 2.2 MG/DL (ref 1.3–2.1)
MCH RBC QN AUTO: 33.6 PG (ref 28–32)
MCHC RBC AUTO-ENTMCNC: 33.3 G/DL (ref 31–35)
MCV RBC AUTO: 100.8 FL (ref 81–99)
MONOCYTES # BLD AUTO: 1 10*3/UL (ref 0.2–0.8)
MONOCYTES NFR BLD AUTO: 9.2 % (ref 4.4–11.3)
NEUTS SEG NFR BLD AUTO: 87.1 % (ref 38.7–80)
PLATELET # BLD AUTO: 211 X10E3/UL (ref 140–360)
POTASSIUM SERPL-SCNC: 4.5 MMOL/L (ref 3.5–5.1)
RBC # BLD AUTO: 4.97 X10E6/UL (ref 4.3–5.7)
SODIUM SERPL-SCNC: 138 MMOL/L (ref 136–145)
TRIGL SERPL-MCNC: 97 MG/DL (ref 0–149)

## 2022-10-12 RX ADMIN — METOPROLOL SUCCINATE SCH MG: 25 TABLET, EXTENDED RELEASE ORAL at 08:57

## 2022-10-12 RX ADMIN — ENOXAPARIN SODIUM SCH MG: 80 INJECTION SUBCUTANEOUS at 08:57

## 2022-10-12 RX ADMIN — PANTOPRAZOLE SODIUM SCH MG: 40 TABLET, DELAYED RELEASE ORAL at 08:03

## 2022-10-12 RX ADMIN — ASPIRIN 81 MG CHEWABLE TABLET SCH MG: 81 TABLET CHEWABLE at 08:58

## 2022-10-12 RX ADMIN — IPRATROPIUM BROMIDE AND ALBUTEROL SULFATE PRN ML: .5; 2.5 SOLUTION RESPIRATORY (INHALATION) at 19:55

## 2022-10-12 RX ADMIN — METOPROLOL SUCCINATE SCH MG: 50 TABLET, EXTENDED RELEASE ORAL at 21:26

## 2022-10-12 RX ADMIN — Medication SCH MG: at 21:26

## 2022-10-12 RX ADMIN — CLOPIDOGREL BISULFATE SCH MG: 75 TABLET, FILM COATED ORAL at 08:57

## 2022-10-12 RX ADMIN — IPRATROPIUM BROMIDE AND ALBUTEROL SULFATE PRN ML: .5; 2.5 SOLUTION RESPIRATORY (INHALATION) at 07:25

## 2022-10-12 RX ADMIN — BENZONATATE PRN MG: 100 CAPSULE ORAL at 00:52

## 2022-10-12 RX ADMIN — BENZONATATE PRN MG: 100 CAPSULE ORAL at 17:40

## 2022-10-13 VITALS — DIASTOLIC BLOOD PRESSURE: 94 MMHG | SYSTOLIC BLOOD PRESSURE: 151 MMHG

## 2022-10-13 VITALS — SYSTOLIC BLOOD PRESSURE: 153 MMHG | DIASTOLIC BLOOD PRESSURE: 86 MMHG

## 2022-10-13 VITALS — SYSTOLIC BLOOD PRESSURE: 114 MMHG | DIASTOLIC BLOOD PRESSURE: 75 MMHG

## 2022-10-13 VITALS — SYSTOLIC BLOOD PRESSURE: 158 MMHG | DIASTOLIC BLOOD PRESSURE: 89 MMHG

## 2022-10-13 VITALS — SYSTOLIC BLOOD PRESSURE: 144 MMHG | DIASTOLIC BLOOD PRESSURE: 106 MMHG

## 2022-10-13 VITALS — DIASTOLIC BLOOD PRESSURE: 92 MMHG | SYSTOLIC BLOOD PRESSURE: 166 MMHG

## 2022-10-13 VITALS — SYSTOLIC BLOOD PRESSURE: 146 MMHG | DIASTOLIC BLOOD PRESSURE: 83 MMHG

## 2022-10-13 VITALS — SYSTOLIC BLOOD PRESSURE: 157 MMHG | DIASTOLIC BLOOD PRESSURE: 96 MMHG

## 2022-10-13 VITALS — DIASTOLIC BLOOD PRESSURE: 82 MMHG | SYSTOLIC BLOOD PRESSURE: 141 MMHG

## 2022-10-13 VITALS — DIASTOLIC BLOOD PRESSURE: 73 MMHG | SYSTOLIC BLOOD PRESSURE: 116 MMHG

## 2022-10-13 VITALS — DIASTOLIC BLOOD PRESSURE: 86 MMHG | SYSTOLIC BLOOD PRESSURE: 135 MMHG

## 2022-10-13 VITALS — DIASTOLIC BLOOD PRESSURE: 73 MMHG | SYSTOLIC BLOOD PRESSURE: 153 MMHG

## 2022-10-13 VITALS — SYSTOLIC BLOOD PRESSURE: 167 MMHG | DIASTOLIC BLOOD PRESSURE: 99 MMHG

## 2022-10-13 VITALS — SYSTOLIC BLOOD PRESSURE: 156 MMHG | DIASTOLIC BLOOD PRESSURE: 91 MMHG

## 2022-10-13 VITALS — SYSTOLIC BLOOD PRESSURE: 126 MMHG | DIASTOLIC BLOOD PRESSURE: 81 MMHG

## 2022-10-13 VITALS — DIASTOLIC BLOOD PRESSURE: 87 MMHG | SYSTOLIC BLOOD PRESSURE: 131 MMHG

## 2022-10-13 VITALS — SYSTOLIC BLOOD PRESSURE: 130 MMHG | DIASTOLIC BLOOD PRESSURE: 85 MMHG

## 2022-10-13 VITALS — SYSTOLIC BLOOD PRESSURE: 125 MMHG | DIASTOLIC BLOOD PRESSURE: 73 MMHG

## 2022-10-13 VITALS — SYSTOLIC BLOOD PRESSURE: 146 MMHG | DIASTOLIC BLOOD PRESSURE: 86 MMHG

## 2022-10-13 VITALS — DIASTOLIC BLOOD PRESSURE: 85 MMHG | SYSTOLIC BLOOD PRESSURE: 152 MMHG

## 2022-10-13 VITALS — DIASTOLIC BLOOD PRESSURE: 87 MMHG | SYSTOLIC BLOOD PRESSURE: 139 MMHG

## 2022-10-13 VITALS — SYSTOLIC BLOOD PRESSURE: 135 MMHG | DIASTOLIC BLOOD PRESSURE: 86 MMHG

## 2022-10-13 VITALS — SYSTOLIC BLOOD PRESSURE: 150 MMHG | DIASTOLIC BLOOD PRESSURE: 91 MMHG

## 2022-10-13 LAB
ALBUMIN SERPL-MCNC: 2.8 G/DL (ref 3.5–5)
ALBUMIN/GLOB SERPL: 0.8 {RATIO} (ref 0.8–2)
ALP SERPL-CCNC: 55 IU/L (ref 40–150)
ALT SERPL-CCNC: 23 IU/L (ref 0–55)
ANION GAP SERPL CALC-SCNC: 15.3 MMOL/L (ref 8–16)
BASOPHILS # BLD AUTO: 0 10*3/UL (ref 0–0.1)
BASOPHILS NFR BLD AUTO: 0.4 % (ref 0–1)
BUN SERPL-MCNC: 39 MG/DL (ref 7–26)
BUN/CREAT SERPL: 20 (ref 6–25)
CALCIUM SERPL-MCNC: 8.1 MG/DL (ref 8.4–10.2)
CHLORIDE SERPL-SCNC: 111 MMOL/L (ref 98–107)
CO2 SERPL-SCNC: 18 MMOL/L (ref 22–29)
DEPRECATED NEUTROPHILS # BLD AUTO: 5.7 10*3/UL (ref 2.1–6.9)
EOSINOPHIL # BLD AUTO: 0 10*3/UL (ref 0–0.4)
EOSINOPHIL NFR BLD AUTO: 0.4 % (ref 0–6)
ERYTHROCYTE [DISTWIDTH] IN CORD BLOOD: 12.3 % (ref 11.7–14.4)
GLOBULIN PLAS-MCNC: 3.3 G/DL (ref 2.3–3.5)
GLUCOSE SERPLBLD-MCNC: 82 MG/DL (ref 74–118)
HCT VFR BLD AUTO: 44.5 % (ref 38.2–49.6)
HGB BLD-MCNC: 14.7 G/DL (ref 14–18)
LYMPHOCYTES # BLD: 0.7 10*3/UL (ref 1–3.2)
LYMPHOCYTES NFR BLD AUTO: 9.2 % (ref 18–39.1)
MCH RBC QN AUTO: 33.6 PG (ref 28–32)
MCHC RBC AUTO-ENTMCNC: 33 G/DL (ref 31–35)
MCV RBC AUTO: 101.6 FL (ref 81–99)
MONOCYTES # BLD AUTO: 0.7 10*3/UL (ref 0.2–0.8)
MONOCYTES NFR BLD AUTO: 9.7 % (ref 4.4–11.3)
NEUTS SEG NFR BLD AUTO: 80.2 % (ref 38.7–80)
PLATELET # BLD AUTO: 178 X10E3/UL (ref 140–360)
POTASSIUM SERPL-SCNC: 4.3 MMOL/L (ref 3.5–5.1)
RBC # BLD AUTO: 4.38 X10E6/UL (ref 4.3–5.7)
SODIUM SERPL-SCNC: 140 MMOL/L (ref 136–145)

## 2022-10-13 RX ADMIN — BENZONATATE PRN MG: 100 CAPSULE ORAL at 01:27

## 2022-10-13 RX ADMIN — CLOPIDOGREL BISULFATE SCH MG: 75 TABLET, FILM COATED ORAL at 09:20

## 2022-10-13 RX ADMIN — SODIUM CHLORIDE SCH MLS/HR: 9 INJECTION, SOLUTION INTRAVENOUS at 15:45

## 2022-10-13 RX ADMIN — PANTOPRAZOLE SODIUM SCH MG: 40 TABLET, DELAYED RELEASE ORAL at 09:19

## 2022-10-13 RX ADMIN — Medication SCH MG: at 21:17

## 2022-10-13 RX ADMIN — METOPROLOL SUCCINATE SCH MG: 50 TABLET, EXTENDED RELEASE ORAL at 21:17

## 2022-10-13 RX ADMIN — ASPIRIN 81 MG CHEWABLE TABLET SCH MG: 81 TABLET CHEWABLE at 09:19

## 2022-10-13 RX ADMIN — SODIUM CHLORIDE SCH MLS/HR: 9 INJECTION, SOLUTION INTRAVENOUS at 19:56

## 2022-10-13 RX ADMIN — METOPROLOL SUCCINATE SCH MG: 50 TABLET, EXTENDED RELEASE ORAL at 09:19

## 2022-10-14 VITALS — SYSTOLIC BLOOD PRESSURE: 145 MMHG | DIASTOLIC BLOOD PRESSURE: 89 MMHG

## 2022-10-14 VITALS — SYSTOLIC BLOOD PRESSURE: 132 MMHG | DIASTOLIC BLOOD PRESSURE: 106 MMHG

## 2022-10-14 VITALS — DIASTOLIC BLOOD PRESSURE: 98 MMHG | SYSTOLIC BLOOD PRESSURE: 160 MMHG

## 2022-10-14 VITALS — DIASTOLIC BLOOD PRESSURE: 73 MMHG | SYSTOLIC BLOOD PRESSURE: 133 MMHG

## 2022-10-14 VITALS — SYSTOLIC BLOOD PRESSURE: 146 MMHG | DIASTOLIC BLOOD PRESSURE: 89 MMHG

## 2022-10-14 VITALS — SYSTOLIC BLOOD PRESSURE: 139 MMHG | DIASTOLIC BLOOD PRESSURE: 84 MMHG

## 2022-10-14 VITALS — DIASTOLIC BLOOD PRESSURE: 78 MMHG | SYSTOLIC BLOOD PRESSURE: 142 MMHG

## 2022-10-14 VITALS — DIASTOLIC BLOOD PRESSURE: 100 MMHG | SYSTOLIC BLOOD PRESSURE: 173 MMHG

## 2022-10-14 VITALS — SYSTOLIC BLOOD PRESSURE: 161 MMHG | DIASTOLIC BLOOD PRESSURE: 87 MMHG

## 2022-10-14 VITALS — SYSTOLIC BLOOD PRESSURE: 146 MMHG | DIASTOLIC BLOOD PRESSURE: 77 MMHG

## 2022-10-14 VITALS — SYSTOLIC BLOOD PRESSURE: 170 MMHG | DIASTOLIC BLOOD PRESSURE: 96 MMHG

## 2022-10-14 VITALS — SYSTOLIC BLOOD PRESSURE: 184 MMHG | DIASTOLIC BLOOD PRESSURE: 97 MMHG

## 2022-10-14 VITALS — DIASTOLIC BLOOD PRESSURE: 93 MMHG | SYSTOLIC BLOOD PRESSURE: 170 MMHG

## 2022-10-14 VITALS — SYSTOLIC BLOOD PRESSURE: 179 MMHG | DIASTOLIC BLOOD PRESSURE: 96 MMHG

## 2022-10-14 VITALS — SYSTOLIC BLOOD PRESSURE: 169 MMHG | DIASTOLIC BLOOD PRESSURE: 100 MMHG

## 2022-10-14 VITALS — DIASTOLIC BLOOD PRESSURE: 100 MMHG | SYSTOLIC BLOOD PRESSURE: 169 MMHG

## 2022-10-14 VITALS — DIASTOLIC BLOOD PRESSURE: 88 MMHG | SYSTOLIC BLOOD PRESSURE: 148 MMHG

## 2022-10-14 VITALS — SYSTOLIC BLOOD PRESSURE: 150 MMHG | DIASTOLIC BLOOD PRESSURE: 110 MMHG

## 2022-10-14 VITALS — SYSTOLIC BLOOD PRESSURE: 165 MMHG | DIASTOLIC BLOOD PRESSURE: 131 MMHG

## 2022-10-14 VITALS — DIASTOLIC BLOOD PRESSURE: 121 MMHG | SYSTOLIC BLOOD PRESSURE: 147 MMHG

## 2022-10-14 VITALS — SYSTOLIC BLOOD PRESSURE: 136 MMHG | DIASTOLIC BLOOD PRESSURE: 79 MMHG

## 2022-10-14 VITALS — SYSTOLIC BLOOD PRESSURE: 163 MMHG | DIASTOLIC BLOOD PRESSURE: 89 MMHG

## 2022-10-14 VITALS — SYSTOLIC BLOOD PRESSURE: 161 MMHG | DIASTOLIC BLOOD PRESSURE: 86 MMHG

## 2022-10-14 LAB
ANION GAP SERPL CALC-SCNC: 13.2 MMOL/L (ref 8–16)
BUN SERPL-MCNC: 29 MG/DL (ref 7–26)
BUN/CREAT SERPL: 17 (ref 6–25)
CALCIUM SERPL-MCNC: 8.2 MG/DL (ref 8.4–10.2)
CHLORIDE SERPL-SCNC: 109 MMOL/L (ref 98–107)
CO2 SERPL-SCNC: 20 MMOL/L (ref 22–29)
GLUCOSE SERPLBLD-MCNC: 89 MG/DL (ref 74–118)
POTASSIUM SERPL-SCNC: 4.2 MMOL/L (ref 3.5–5.1)
SODIUM SERPL-SCNC: 138 MMOL/L (ref 136–145)

## 2022-10-14 RX ADMIN — PANTOPRAZOLE SODIUM SCH MG: 40 TABLET, DELAYED RELEASE ORAL at 08:49

## 2022-10-14 RX ADMIN — ENOXAPARIN SODIUM SCH MG: 40 INJECTION SUBCUTANEOUS at 08:49

## 2022-10-14 RX ADMIN — Medication SCH MG: at 21:51

## 2022-10-14 RX ADMIN — METOPROLOL SUCCINATE SCH MG: 50 TABLET, EXTENDED RELEASE ORAL at 08:50

## 2022-10-14 RX ADMIN — CLOPIDOGREL BISULFATE SCH MG: 75 TABLET, FILM COATED ORAL at 08:50

## 2022-10-14 RX ADMIN — METOPROLOL SUCCINATE SCH MG: 50 TABLET, EXTENDED RELEASE ORAL at 21:52

## 2022-10-14 RX ADMIN — BENZONATATE PRN MG: 100 CAPSULE ORAL at 01:20

## 2022-10-14 RX ADMIN — ASPIRIN 81 MG CHEWABLE TABLET SCH MG: 81 TABLET CHEWABLE at 08:49

## 2022-10-15 VITALS — DIASTOLIC BLOOD PRESSURE: 76 MMHG | SYSTOLIC BLOOD PRESSURE: 161 MMHG

## 2022-10-15 VITALS — SYSTOLIC BLOOD PRESSURE: 167 MMHG | DIASTOLIC BLOOD PRESSURE: 100 MMHG

## 2022-10-15 VITALS — DIASTOLIC BLOOD PRESSURE: 99 MMHG | SYSTOLIC BLOOD PRESSURE: 185 MMHG

## 2022-10-15 VITALS — DIASTOLIC BLOOD PRESSURE: 82 MMHG | SYSTOLIC BLOOD PRESSURE: 169 MMHG

## 2022-10-15 VITALS — DIASTOLIC BLOOD PRESSURE: 80 MMHG | SYSTOLIC BLOOD PRESSURE: 170 MMHG

## 2022-10-15 VITALS — DIASTOLIC BLOOD PRESSURE: 103 MMHG | SYSTOLIC BLOOD PRESSURE: 197 MMHG

## 2022-10-15 VITALS — SYSTOLIC BLOOD PRESSURE: 157 MMHG | DIASTOLIC BLOOD PRESSURE: 84 MMHG

## 2022-10-15 VITALS — SYSTOLIC BLOOD PRESSURE: 161 MMHG | DIASTOLIC BLOOD PRESSURE: 65 MMHG

## 2022-10-15 VITALS — SYSTOLIC BLOOD PRESSURE: 172 MMHG | DIASTOLIC BLOOD PRESSURE: 85 MMHG

## 2022-10-15 VITALS — DIASTOLIC BLOOD PRESSURE: 95 MMHG | SYSTOLIC BLOOD PRESSURE: 164 MMHG

## 2022-10-15 VITALS — DIASTOLIC BLOOD PRESSURE: 111 MMHG | SYSTOLIC BLOOD PRESSURE: 187 MMHG

## 2022-10-15 VITALS — DIASTOLIC BLOOD PRESSURE: 83 MMHG | SYSTOLIC BLOOD PRESSURE: 149 MMHG

## 2022-10-15 VITALS — DIASTOLIC BLOOD PRESSURE: 69 MMHG | SYSTOLIC BLOOD PRESSURE: 171 MMHG

## 2022-10-15 VITALS — SYSTOLIC BLOOD PRESSURE: 180 MMHG | DIASTOLIC BLOOD PRESSURE: 89 MMHG

## 2022-10-15 VITALS — SYSTOLIC BLOOD PRESSURE: 166 MMHG | DIASTOLIC BLOOD PRESSURE: 89 MMHG

## 2022-10-15 VITALS — DIASTOLIC BLOOD PRESSURE: 90 MMHG | SYSTOLIC BLOOD PRESSURE: 162 MMHG

## 2022-10-15 VITALS — DIASTOLIC BLOOD PRESSURE: 113 MMHG | SYSTOLIC BLOOD PRESSURE: 187 MMHG

## 2022-10-15 VITALS — DIASTOLIC BLOOD PRESSURE: 101 MMHG | SYSTOLIC BLOOD PRESSURE: 175 MMHG

## 2022-10-15 VITALS — SYSTOLIC BLOOD PRESSURE: 177 MMHG | DIASTOLIC BLOOD PRESSURE: 90 MMHG

## 2022-10-15 VITALS — DIASTOLIC BLOOD PRESSURE: 100 MMHG | SYSTOLIC BLOOD PRESSURE: 166 MMHG

## 2022-10-15 RX ADMIN — ASPIRIN 81 MG CHEWABLE TABLET SCH MG: 81 TABLET CHEWABLE at 08:25

## 2022-10-15 RX ADMIN — METOPROLOL SUCCINATE SCH MG: 50 TABLET, EXTENDED RELEASE ORAL at 08:26

## 2022-10-15 RX ADMIN — PANTOPRAZOLE SODIUM SCH MG: 40 TABLET, DELAYED RELEASE ORAL at 08:26

## 2022-10-15 RX ADMIN — ENOXAPARIN SODIUM SCH MG: 40 INJECTION SUBCUTANEOUS at 08:27

## 2022-10-15 RX ADMIN — HYDRALAZINE HYDROCHLORIDE PRN MG: 20 INJECTION INTRAMUSCULAR; INTRAVENOUS at 15:25

## 2022-10-15 RX ADMIN — HYDRALAZINE HYDROCHLORIDE PRN MG: 20 INJECTION INTRAMUSCULAR; INTRAVENOUS at 05:22

## 2022-10-15 RX ADMIN — CLOPIDOGREL BISULFATE SCH MG: 75 TABLET, FILM COATED ORAL at 08:26

## 2024-12-23 ENCOUNTER — HOSPITAL ENCOUNTER (INPATIENT)
Dept: HOSPITAL 88 - ER | Age: 69
LOS: 1 days | Discharge: HOME | DRG: 291 | End: 2024-12-24
Attending: INTERNAL MEDICINE | Admitting: INTERNAL MEDICINE
Payer: MEDICARE

## 2024-12-23 VITALS — DIASTOLIC BLOOD PRESSURE: 94 MMHG | RESPIRATION RATE: 26 BRPM | SYSTOLIC BLOOD PRESSURE: 152 MMHG | HEART RATE: 90 BPM

## 2024-12-23 VITALS
DIASTOLIC BLOOD PRESSURE: 66 MMHG | SYSTOLIC BLOOD PRESSURE: 110 MMHG | RESPIRATION RATE: 12 BRPM | TEMPERATURE: 98 F | HEART RATE: 61 BPM | OXYGEN SATURATION: 91 %

## 2024-12-23 VITALS
OXYGEN SATURATION: 96 % | SYSTOLIC BLOOD PRESSURE: 160 MMHG | RESPIRATION RATE: 18 BRPM | TEMPERATURE: 97.8 F | HEART RATE: 80 BPM | DIASTOLIC BLOOD PRESSURE: 81 MMHG

## 2024-12-23 VITALS — HEART RATE: 80 BPM | OXYGEN SATURATION: 100 %

## 2024-12-23 VITALS
OXYGEN SATURATION: 96 % | RESPIRATION RATE: 15 BRPM | HEART RATE: 71 BPM | SYSTOLIC BLOOD PRESSURE: 128 MMHG | DIASTOLIC BLOOD PRESSURE: 68 MMHG

## 2024-12-23 VITALS
HEART RATE: 103 BPM | RESPIRATION RATE: 29 BRPM | DIASTOLIC BLOOD PRESSURE: 110 MMHG | OXYGEN SATURATION: 95 % | SYSTOLIC BLOOD PRESSURE: 205 MMHG

## 2024-12-23 VITALS — TEMPERATURE: 97.8 F

## 2024-12-23 VITALS
RESPIRATION RATE: 32 BRPM | DIASTOLIC BLOOD PRESSURE: 87 MMHG | SYSTOLIC BLOOD PRESSURE: 160 MMHG | HEART RATE: 88 BPM | OXYGEN SATURATION: 96 %

## 2024-12-23 VITALS — RESPIRATION RATE: 31 BRPM | DIASTOLIC BLOOD PRESSURE: 120 MMHG | SYSTOLIC BLOOD PRESSURE: 196 MMHG | HEART RATE: 96 BPM

## 2024-12-23 VITALS — WEIGHT: 125.38 LBS | HEIGHT: 64 IN | BODY MASS INDEX: 21.4 KG/M2

## 2024-12-23 VITALS
SYSTOLIC BLOOD PRESSURE: 162 MMHG | OXYGEN SATURATION: 99 % | HEART RATE: 86 BPM | RESPIRATION RATE: 20 BRPM | DIASTOLIC BLOOD PRESSURE: 92 MMHG

## 2024-12-23 VITALS
HEART RATE: 99 BPM | OXYGEN SATURATION: 97 % | RESPIRATION RATE: 25 BRPM | SYSTOLIC BLOOD PRESSURE: 204 MMHG | DIASTOLIC BLOOD PRESSURE: 108 MMHG

## 2024-12-23 VITALS
HEART RATE: 60 BPM | OXYGEN SATURATION: 96 % | SYSTOLIC BLOOD PRESSURE: 104 MMHG | DIASTOLIC BLOOD PRESSURE: 63 MMHG | TEMPERATURE: 98 F | RESPIRATION RATE: 17 BRPM

## 2024-12-23 VITALS
DIASTOLIC BLOOD PRESSURE: 117 MMHG | OXYGEN SATURATION: 100 % | TEMPERATURE: 97.7 F | HEART RATE: 87 BPM | SYSTOLIC BLOOD PRESSURE: 183 MMHG | RESPIRATION RATE: 26 BRPM

## 2024-12-23 VITALS
RESPIRATION RATE: 26 BRPM | SYSTOLIC BLOOD PRESSURE: 176 MMHG | HEART RATE: 86 BPM | OXYGEN SATURATION: 97 % | DIASTOLIC BLOOD PRESSURE: 87 MMHG

## 2024-12-23 VITALS
OXYGEN SATURATION: 95 % | DIASTOLIC BLOOD PRESSURE: 94 MMHG | SYSTOLIC BLOOD PRESSURE: 171 MMHG | RESPIRATION RATE: 11 BRPM | HEART RATE: 73 BPM

## 2024-12-23 VITALS — DIASTOLIC BLOOD PRESSURE: 96 MMHG | SYSTOLIC BLOOD PRESSURE: 154 MMHG

## 2024-12-23 VITALS
OXYGEN SATURATION: 98 % | DIASTOLIC BLOOD PRESSURE: 125 MMHG | SYSTOLIC BLOOD PRESSURE: 188 MMHG | HEART RATE: 87 BPM | RESPIRATION RATE: 44 BRPM

## 2024-12-23 VITALS
OXYGEN SATURATION: 97 % | SYSTOLIC BLOOD PRESSURE: 162 MMHG | RESPIRATION RATE: 24 BRPM | HEART RATE: 86 BPM | DIASTOLIC BLOOD PRESSURE: 89 MMHG

## 2024-12-23 VITALS
OXYGEN SATURATION: 98 % | SYSTOLIC BLOOD PRESSURE: 101 MMHG | DIASTOLIC BLOOD PRESSURE: 51 MMHG | HEART RATE: 111 BPM | RESPIRATION RATE: 18 BRPM

## 2024-12-23 VITALS
OXYGEN SATURATION: 93 % | SYSTOLIC BLOOD PRESSURE: 89 MMHG | RESPIRATION RATE: 14 BRPM | DIASTOLIC BLOOD PRESSURE: 47 MMHG | HEART RATE: 58 BPM

## 2024-12-23 VITALS
RESPIRATION RATE: 52 BRPM | SYSTOLIC BLOOD PRESSURE: 167 MMHG | OXYGEN SATURATION: 99 % | HEART RATE: 89 BPM | DIASTOLIC BLOOD PRESSURE: 81 MMHG

## 2024-12-23 VITALS
RESPIRATION RATE: 21 BRPM | TEMPERATURE: 97.7 F | DIASTOLIC BLOOD PRESSURE: 117 MMHG | HEART RATE: 88 BPM | SYSTOLIC BLOOD PRESSURE: 183 MMHG | OXYGEN SATURATION: 100 %

## 2024-12-23 VITALS
TEMPERATURE: 97.7 F | RESPIRATION RATE: 18 BRPM | SYSTOLIC BLOOD PRESSURE: 161 MMHG | HEART RATE: 78 BPM | OXYGEN SATURATION: 97 % | DIASTOLIC BLOOD PRESSURE: 109 MMHG

## 2024-12-23 VITALS
DIASTOLIC BLOOD PRESSURE: 73 MMHG | SYSTOLIC BLOOD PRESSURE: 152 MMHG | OXYGEN SATURATION: 97 % | HEART RATE: 80 BPM | RESPIRATION RATE: 20 BRPM

## 2024-12-23 VITALS
SYSTOLIC BLOOD PRESSURE: 183 MMHG | RESPIRATION RATE: 27 BRPM | DIASTOLIC BLOOD PRESSURE: 109 MMHG | OXYGEN SATURATION: 97 % | HEART RATE: 86 BPM

## 2024-12-23 VITALS
SYSTOLIC BLOOD PRESSURE: 180 MMHG | DIASTOLIC BLOOD PRESSURE: 127 MMHG | RESPIRATION RATE: 38 BRPM | OXYGEN SATURATION: 98 % | HEART RATE: 95 BPM

## 2024-12-23 VITALS
DIASTOLIC BLOOD PRESSURE: 66 MMHG | RESPIRATION RATE: 19 BRPM | SYSTOLIC BLOOD PRESSURE: 111 MMHG | HEART RATE: 61 BPM | OXYGEN SATURATION: 95 %

## 2024-12-23 VITALS
SYSTOLIC BLOOD PRESSURE: 191 MMHG | DIASTOLIC BLOOD PRESSURE: 95 MMHG | OXYGEN SATURATION: 99 % | TEMPERATURE: 97.6 F | RESPIRATION RATE: 15 BRPM | HEART RATE: 80 BPM

## 2024-12-23 VITALS
RESPIRATION RATE: 25 BRPM | SYSTOLIC BLOOD PRESSURE: 181 MMHG | OXYGEN SATURATION: 99 % | HEART RATE: 88 BPM | DIASTOLIC BLOOD PRESSURE: 114 MMHG

## 2024-12-23 VITALS — OXYGEN SATURATION: 96 % | HEART RATE: 87 BPM | RESPIRATION RATE: 20 BRPM

## 2024-12-23 VITALS — HEART RATE: 78 BPM | RESPIRATION RATE: 18 BRPM | OXYGEN SATURATION: 97 %

## 2024-12-23 VITALS
HEART RATE: 59 BPM | DIASTOLIC BLOOD PRESSURE: 46 MMHG | RESPIRATION RATE: 17 BRPM | OXYGEN SATURATION: 93 % | SYSTOLIC BLOOD PRESSURE: 88 MMHG

## 2024-12-23 VITALS
SYSTOLIC BLOOD PRESSURE: 204 MMHG | TEMPERATURE: 97.8 F | OXYGEN SATURATION: 100 % | HEART RATE: 100 BPM | DIASTOLIC BLOOD PRESSURE: 109 MMHG | RESPIRATION RATE: 31 BRPM

## 2024-12-23 VITALS
RESPIRATION RATE: 17 BRPM | OXYGEN SATURATION: 97 % | SYSTOLIC BLOOD PRESSURE: 165 MMHG | DIASTOLIC BLOOD PRESSURE: 91 MMHG | HEART RATE: 81 BPM

## 2024-12-23 VITALS
HEART RATE: 84 BPM | SYSTOLIC BLOOD PRESSURE: 160 MMHG | RESPIRATION RATE: 26 BRPM | OXYGEN SATURATION: 96 % | DIASTOLIC BLOOD PRESSURE: 86 MMHG

## 2024-12-23 VITALS — RESPIRATION RATE: 18 BRPM | OXYGEN SATURATION: 96 % | HEART RATE: 82 BPM

## 2024-12-23 VITALS
SYSTOLIC BLOOD PRESSURE: 175 MMHG | RESPIRATION RATE: 22 BRPM | OXYGEN SATURATION: 97 % | HEART RATE: 97 BPM | DIASTOLIC BLOOD PRESSURE: 82 MMHG

## 2024-12-23 VITALS
SYSTOLIC BLOOD PRESSURE: 190 MMHG | DIASTOLIC BLOOD PRESSURE: 119 MMHG | HEART RATE: 85 BPM | RESPIRATION RATE: 47 BRPM | OXYGEN SATURATION: 100 %

## 2024-12-23 VITALS
HEART RATE: 77 BPM | DIASTOLIC BLOOD PRESSURE: 91 MMHG | RESPIRATION RATE: 20 BRPM | OXYGEN SATURATION: 100 % | SYSTOLIC BLOOD PRESSURE: 159 MMHG

## 2024-12-23 VITALS
SYSTOLIC BLOOD PRESSURE: 171 MMHG | RESPIRATION RATE: 19 BRPM | HEART RATE: 88 BPM | OXYGEN SATURATION: 98 % | DIASTOLIC BLOOD PRESSURE: 85 MMHG

## 2024-12-23 VITALS — HEART RATE: 86 BPM

## 2024-12-23 VITALS — OXYGEN SATURATION: 99 % | HEART RATE: 71 BPM

## 2024-12-23 DIAGNOSIS — E78.5: ICD-10-CM

## 2024-12-23 DIAGNOSIS — I50.41: ICD-10-CM

## 2024-12-23 DIAGNOSIS — I25.2: ICD-10-CM

## 2024-12-23 DIAGNOSIS — I25.10: ICD-10-CM

## 2024-12-23 DIAGNOSIS — F17.210: ICD-10-CM

## 2024-12-23 DIAGNOSIS — N18.30: ICD-10-CM

## 2024-12-23 DIAGNOSIS — I16.1: ICD-10-CM

## 2024-12-23 DIAGNOSIS — I13.0: Primary | ICD-10-CM

## 2024-12-23 LAB
ALBUMIN SERPL-MCNC: 3.4 G/DL (ref 3.5–5)
ALBUMIN/GLOB SERPL: 0.8 {RATIO} (ref 0.8–2)
ALP SERPL-CCNC: 122 IU/L (ref 40–150)
ALT SERPL-CCNC: 14 IU/L (ref 0–55)
ANION GAP SERPL CALC-SCNC: 17.3 MMOL/L (ref 8–16)
BASOPHILS # BLD AUTO: 0.1 10*3/UL (ref 0–0.1)
BASOPHILS NFR BLD AUTO: 1.1 % (ref 0–1)
BILIRUB SERPL-MCNC: 0.8 MG/DL (ref 0.2–1.2)
BUN SERPL-MCNC: 27 MG/DL (ref 7–26)
BUN/CREAT SERPL: 13 (ref 6–25)
CALCIUM SERPL-MCNC: 8.7 MG/DL (ref 8.4–10.2)
CHLORIDE SERPL-SCNC: 105 MMOL/L (ref 98–107)
CHOLEST SERPL-MCNC: 163 MD/DL (ref 0–199)
CHOLEST/HDLC SERPL: 2.2 {RATIO} (ref 3.9–4.7)
CK SERPL-CCNC: 101 IU/L (ref 30–200)
CK SERPL-CCNC: 115 IU/L (ref 30–200)
CK SERPL-CCNC: 115 IU/L (ref 30–200)
CO2 SERPL-SCNC: 18 MMOL/L (ref 22–29)
DEPRECATED NEUTROPHILS # BLD AUTO: 6.1 10*3/UL (ref 2.1–6.9)
EGFRCR SERPLBLD CKD-EPI 2021: 32 ML/MIN (ref 60–?)
EOSINOPHIL # BLD AUTO: 0.3 10*3/UL (ref 0–0.4)
EOSINOPHIL NFR BLD AUTO: 3.4 % (ref 0–6)
ERYTHROCYTE [DISTWIDTH] IN CORD BLOOD: 13.4 % (ref 11.7–14.4)
GLOBULIN PLAS-MCNC: 4.3 G/DL (ref 2.3–3.5)
GLUCOSE SERPLBLD-MCNC: 105 MG/DL (ref 74–118)
HCT VFR BLD AUTO: 42.6 % (ref 38.2–49.6)
HDLC SERPL-MSCNC: 73 MG/DL (ref 40–60)
HGB BLD-MCNC: 13.5 G/DL (ref 14–18)
LDLC SERPL CALC-MCNC: 74 MG/DL (ref 60–130)
LYMPHOCYTES # BLD: 0.5 10*3/UL (ref 1–3.2)
LYMPHOCYTES NFR BLD AUTO: 6.9 % (ref 18–39.1)
MCH RBC QN AUTO: 32.1 PG (ref 28–32)
MCHC RBC AUTO-ENTMCNC: 31.7 G/DL (ref 31–35)
MCV RBC AUTO: 101.4 FL (ref 81–99)
MONOCYTES # BLD AUTO: 0.7 10*3/UL (ref 0.2–0.8)
MONOCYTES NFR BLD AUTO: 8.6 % (ref 4.4–11.3)
NEUTS SEG NFR BLD AUTO: 79.6 % (ref 38.7–80)
PLATELET # BLD AUTO: 291 X10E3/UL (ref 140–360)
POTASSIUM SERPL-SCNC: 4.3 MMOL/L (ref 3.5–5.1)
PROT SERPL-MCNC: 7.7 G/DL (ref 6.5–8.1)
RBC # BLD AUTO: 4.2 X10E6/UL (ref 4.3–5.7)
SODIUM SERPL-SCNC: 136 MMOL/L (ref 136–145)
TRIGL SERPL-MCNC: 82 MG/DL (ref 0–149)
TROPONIN I SERPL DL<=0.01 NG/ML-MCNC: 0.1 NG/ML (ref 0–0.3)
TROPONIN I SERPL DL<=0.01 NG/ML-MCNC: 0.1 NG/ML (ref 0–0.3)
TROPONIN I SERPL DL<=0.01 NG/ML-MCNC: 0.12 NG/ML (ref 0–0.3)
WBC # BLD: 7.59 X10E3/UL (ref 4.8–10.8)

## 2024-12-23 PROCEDURE — 84484 ASSAY OF TROPONIN QUANT: CPT

## 2024-12-23 PROCEDURE — 71045 X-RAY EXAM CHEST 1 VIEW: CPT

## 2024-12-23 PROCEDURE — 80061 LIPID PANEL: CPT

## 2024-12-23 PROCEDURE — 36415 COLL VENOUS BLD VENIPUNCTURE: CPT

## 2024-12-23 PROCEDURE — 83880 ASSAY OF NATRIURETIC PEPTIDE: CPT

## 2024-12-23 PROCEDURE — 93306 TTE W/DOPPLER COMPLETE: CPT

## 2024-12-23 PROCEDURE — 85025 COMPLETE CBC W/AUTO DIFF WBC: CPT

## 2024-12-23 PROCEDURE — 93005 ELECTROCARDIOGRAM TRACING: CPT

## 2024-12-23 PROCEDURE — 99284 EMERGENCY DEPT VISIT MOD MDM: CPT

## 2024-12-23 PROCEDURE — 94799 UNLISTED PULMONARY SVC/PX: CPT

## 2024-12-23 PROCEDURE — 80053 COMPREHEN METABOLIC PANEL: CPT

## 2024-12-23 PROCEDURE — 99252 IP/OBS CONSLTJ NEW/EST SF 35: CPT

## 2024-12-23 PROCEDURE — 82550 ASSAY OF CK (CPK): CPT

## 2024-12-23 RX ADMIN — ASPIRIN SCH MG: 81 TABLET, COATED ORAL at 12:17

## 2024-12-23 RX ADMIN — FUROSEMIDE ONE MG: 10 INJECTION, SOLUTION INTRAMUSCULAR; INTRAVENOUS at 02:34

## 2024-12-23 RX ADMIN — HYDRALAZINE HYDROCHLORIDE SCH MG: 25 TABLET ORAL at 17:08

## 2024-12-23 RX ADMIN — FUROSEMIDE SCH MG: 10 INJECTION, SOLUTION INTRAMUSCULAR; INTRAVENOUS at 08:12

## 2024-12-23 RX ADMIN — FAMOTIDINE SCH MG: 20 TABLET, FILM COATED ORAL at 17:08

## 2024-12-23 RX ADMIN — CARVEDILOL SCH MG: 12.5 TABLET, FILM COATED ORAL at 11:45

## 2024-12-23 RX ADMIN — ENOXAPARIN SODIUM SCH MG: 30 INJECTION SUBCUTANEOUS at 17:08

## 2024-12-23 RX ADMIN — FUROSEMIDE SCH MG: 10 INJECTION, SOLUTION INTRAMUSCULAR; INTRAVENOUS at 17:09

## 2024-12-23 RX ADMIN — HYDRALAZINE HYDROCHLORIDE PRN MG: 20 INJECTION INTRAMUSCULAR; INTRAVENOUS at 04:40

## 2024-12-23 RX ADMIN — FUROSEMIDE ONE MG: 10 INJECTION, SOLUTION INTRAMUSCULAR; INTRAVENOUS at 03:56

## 2024-12-23 RX ADMIN — Medication SCH MG: at 12:18

## 2024-12-23 RX ADMIN — HYDRALAZINE HYDROCHLORIDE SCH MG: 25 TABLET ORAL at 14:16

## 2024-12-23 RX ADMIN — METOPROLOL TARTRATE SCH MG: 25 TABLET, FILM COATED ORAL at 08:12

## 2024-12-23 RX ADMIN — MUPIROCIN SCH GM: 20 OINTMENT TOPICAL at 21:11

## 2024-12-24 VITALS
DIASTOLIC BLOOD PRESSURE: 61 MMHG | OXYGEN SATURATION: 97 % | SYSTOLIC BLOOD PRESSURE: 121 MMHG | HEART RATE: 61 BPM | RESPIRATION RATE: 29 BRPM

## 2024-12-24 VITALS
OXYGEN SATURATION: 92 % | DIASTOLIC BLOOD PRESSURE: 61 MMHG | SYSTOLIC BLOOD PRESSURE: 104 MMHG | HEART RATE: 60 BPM | RESPIRATION RATE: 18 BRPM

## 2024-12-24 VITALS
OXYGEN SATURATION: 96 % | SYSTOLIC BLOOD PRESSURE: 109 MMHG | HEART RATE: 62 BPM | RESPIRATION RATE: 13 BRPM | DIASTOLIC BLOOD PRESSURE: 57 MMHG

## 2024-12-24 VITALS
DIASTOLIC BLOOD PRESSURE: 59 MMHG | RESPIRATION RATE: 21 BRPM | TEMPERATURE: 97.7 F | HEART RATE: 57 BPM | OXYGEN SATURATION: 93 % | SYSTOLIC BLOOD PRESSURE: 106 MMHG

## 2024-12-24 VITALS
TEMPERATURE: 97.8 F | HEART RATE: 59 BPM | SYSTOLIC BLOOD PRESSURE: 121 MMHG | OXYGEN SATURATION: 95 % | RESPIRATION RATE: 23 BRPM | DIASTOLIC BLOOD PRESSURE: 67 MMHG

## 2024-12-24 VITALS
SYSTOLIC BLOOD PRESSURE: 108 MMHG | RESPIRATION RATE: 12 BRPM | HEART RATE: 64 BPM | DIASTOLIC BLOOD PRESSURE: 59 MMHG | OXYGEN SATURATION: 96 %

## 2024-12-24 VITALS
HEART RATE: 61 BPM | RESPIRATION RATE: 19 BRPM | SYSTOLIC BLOOD PRESSURE: 113 MMHG | OXYGEN SATURATION: 97 % | DIASTOLIC BLOOD PRESSURE: 71 MMHG

## 2024-12-24 VITALS
OXYGEN SATURATION: 96 % | DIASTOLIC BLOOD PRESSURE: 68 MMHG | SYSTOLIC BLOOD PRESSURE: 118 MMHG | RESPIRATION RATE: 15 BRPM | HEART RATE: 62 BPM

## 2024-12-24 VITALS
OXYGEN SATURATION: 97 % | HEART RATE: 60 BPM | DIASTOLIC BLOOD PRESSURE: 63 MMHG | SYSTOLIC BLOOD PRESSURE: 111 MMHG | RESPIRATION RATE: 15 BRPM

## 2024-12-24 VITALS
DIASTOLIC BLOOD PRESSURE: 72 MMHG | TEMPERATURE: 97.8 F | HEART RATE: 58 BPM | OXYGEN SATURATION: 94 % | RESPIRATION RATE: 21 BRPM | SYSTOLIC BLOOD PRESSURE: 120 MMHG

## 2024-12-24 VITALS
TEMPERATURE: 97.8 F | SYSTOLIC BLOOD PRESSURE: 121 MMHG | DIASTOLIC BLOOD PRESSURE: 67 MMHG | RESPIRATION RATE: 23 BRPM | OXYGEN SATURATION: 95 % | HEART RATE: 59 BPM

## 2024-12-24 VITALS
HEART RATE: 68 BPM | SYSTOLIC BLOOD PRESSURE: 130 MMHG | OXYGEN SATURATION: 95 % | RESPIRATION RATE: 34 BRPM | DIASTOLIC BLOOD PRESSURE: 81 MMHG

## 2024-12-24 VITALS
RESPIRATION RATE: 19 BRPM | DIASTOLIC BLOOD PRESSURE: 62 MMHG | SYSTOLIC BLOOD PRESSURE: 116 MMHG | TEMPERATURE: 97.9 F | OXYGEN SATURATION: 79 % | HEART RATE: 61 BPM

## 2024-12-24 VITALS
SYSTOLIC BLOOD PRESSURE: 119 MMHG | OXYGEN SATURATION: 92 % | HEART RATE: 60 BPM | DIASTOLIC BLOOD PRESSURE: 63 MMHG | RESPIRATION RATE: 23 BRPM

## 2024-12-24 LAB
ALBUMIN SERPL-MCNC: 2.6 G/DL (ref 3.5–5)
ALBUMIN/GLOB SERPL: 0.8 {RATIO} (ref 0.8–2)
ALP SERPL-CCNC: 97 IU/L (ref 40–150)
ALT SERPL-CCNC: 8 IU/L (ref 0–55)
ANION GAP SERPL CALC-SCNC: 16.3 MMOL/L (ref 8–16)
BASOPHILS # BLD AUTO: 0.1 10*3/UL (ref 0–0.1)
BASOPHILS NFR BLD AUTO: 1.1 % (ref 0–1)
BILIRUB SERPL-MCNC: 0.8 MG/DL (ref 0.2–1.2)
BUN SERPL-MCNC: 29 MG/DL (ref 7–26)
BUN/CREAT SERPL: 13 (ref 6–25)
CALCIUM SERPL-MCNC: 8.2 MG/DL (ref 8.4–10.2)
CHLORIDE SERPL-SCNC: 104 MMOL/L (ref 98–107)
CO2 SERPL-SCNC: 20 MMOL/L (ref 22–29)
DEPRECATED NEUTROPHILS # BLD AUTO: 4.1 10*3/UL (ref 2.1–6.9)
EGFRCR SERPLBLD CKD-EPI 2021: 32 ML/MIN (ref 60–?)
EOSINOPHIL # BLD AUTO: 0.3 10*3/UL (ref 0–0.4)
EOSINOPHIL NFR BLD AUTO: 5.2 % (ref 0–6)
ERYTHROCYTE [DISTWIDTH] IN CORD BLOOD: 13.2 % (ref 11.7–14.4)
GLOBULIN PLAS-MCNC: 3.2 G/DL (ref 2.3–3.5)
GLUCOSE SERPLBLD-MCNC: 91 MG/DL (ref 74–118)
HCT VFR BLD AUTO: 37.6 % (ref 38.2–49.6)
HGB BLD-MCNC: 12.3 G/DL (ref 14–18)
LYMPHOCYTES # BLD: 0.5 10*3/UL (ref 1–3.2)
LYMPHOCYTES NFR BLD AUTO: 9.4 % (ref 18–39.1)
MCH RBC QN AUTO: 31.8 PG (ref 28–32)
MCHC RBC AUTO-ENTMCNC: 32.7 G/DL (ref 31–35)
MCV RBC AUTO: 97.2 FL (ref 81–99)
MONOCYTES # BLD AUTO: 0.6 10*3/UL (ref 0.2–0.8)
MONOCYTES NFR BLD AUTO: 10.3 % (ref 4.4–11.3)
NEUTS SEG NFR BLD AUTO: 73.6 % (ref 38.7–80)
PLATELET # BLD AUTO: 250 X10E3/UL (ref 140–360)
POTASSIUM SERPL-SCNC: 3.3 MMOL/L (ref 3.5–5.1)
PROT SERPL-MCNC: 5.8 G/DL (ref 6.5–8.1)
RBC # BLD AUTO: 3.87 X10E6/UL (ref 4.3–5.7)
SODIUM SERPL-SCNC: 137 MMOL/L (ref 136–145)
WBC # BLD: 5.53 X10E3/UL (ref 4.8–10.8)

## 2024-12-24 RX ADMIN — ISOSORBIDE MONONITRATE SCH MG: 30 TABLET, EXTENDED RELEASE ORAL at 10:49

## 2024-12-24 RX ADMIN — Medication SCH MG: at 09:00

## 2025-01-01 ENCOUNTER — HOSPITAL ENCOUNTER (INPATIENT)
Dept: HOSPITAL 88 - ER | Age: 70
LOS: 4 days | Discharge: HOME | DRG: 291 | End: 2025-01-05
Attending: INTERNAL MEDICINE | Admitting: INTERNAL MEDICINE
Payer: MEDICARE

## 2025-01-01 VITALS — WEIGHT: 135 LBS | BODY MASS INDEX: 23.05 KG/M2 | HEIGHT: 64 IN

## 2025-01-01 DIAGNOSIS — Z79.82: ICD-10-CM

## 2025-01-01 DIAGNOSIS — Z82.49: ICD-10-CM

## 2025-01-01 DIAGNOSIS — E87.1: ICD-10-CM

## 2025-01-01 DIAGNOSIS — N17.9: ICD-10-CM

## 2025-01-01 DIAGNOSIS — I13.0: Primary | ICD-10-CM

## 2025-01-01 DIAGNOSIS — N39.0: ICD-10-CM

## 2025-01-01 DIAGNOSIS — I25.10: ICD-10-CM

## 2025-01-01 DIAGNOSIS — Z11.52: ICD-10-CM

## 2025-01-01 DIAGNOSIS — R00.1: ICD-10-CM

## 2025-01-01 DIAGNOSIS — F17.210: ICD-10-CM

## 2025-01-01 DIAGNOSIS — I50.43: ICD-10-CM

## 2025-01-01 DIAGNOSIS — J44.9: ICD-10-CM

## 2025-01-01 DIAGNOSIS — N18.30: ICD-10-CM

## 2025-01-01 LAB
ALBUMIN SERPL-MCNC: 3.1 G/DL (ref 3.5–5)
ALBUMIN/GLOB SERPL: 0.8 {RATIO} (ref 0.8–2)
ALP SERPL-CCNC: 99 IU/L (ref 40–150)
ALT SERPL-CCNC: 12 IU/L (ref 0–55)
ANION GAP SERPL CALC-SCNC: 20 MMOL/L (ref 8–16)
BASOPHILS # BLD AUTO: 0 10*3/UL (ref 0–0.1)
BASOPHILS NFR BLD AUTO: 0.7 % (ref 0–1)
BILIRUB SERPL-MCNC: 0.3 MG/DL (ref 0.2–1.2)
BUN SERPL-MCNC: 53 MG/DL (ref 7–26)
BUN/CREAT SERPL: 17 (ref 6–25)
CALCIUM SERPL-MCNC: 8.3 MG/DL (ref 8.4–10.2)
CHLORIDE SERPL-SCNC: 99 MMOL/L (ref 98–107)
CO2 SERPL-SCNC: 14 MMOL/L (ref 22–29)
COMMENT: (no result)
DEPRECATED NEUTROPHILS # BLD AUTO: 4.7 10*3/UL (ref 2.1–6.9)
EGFRCR SERPLBLD CKD-EPI 2021: 21 ML/MIN (ref 60–?)
EOSINOPHIL # BLD AUTO: 0.2 10*3/UL (ref 0–0.4)
EOSINOPHIL NFR BLD AUTO: 2.5 % (ref 0–6)
ERYTHROCYTE [DISTWIDTH] IN CORD BLOOD: 12.7 % (ref 11.7–14.4)
GLOBULIN PLAS-MCNC: 3.7 G/DL (ref 2.3–3.5)
GLUCOSE SERPLBLD-MCNC: 110 MG/DL (ref 74–118)
HCT VFR BLD AUTO: 33.8 % (ref 38.2–49.6)
HGB BLD-MCNC: 11.2 G/DL (ref 14–18)
LYMPHOCYTES # BLD: 0.5 10*3/UL (ref 1–3.2)
LYMPHOCYTES NFR BLD AUTO: 8.1 % (ref 18–39.1)
MCH RBC QN AUTO: 31.6 PG (ref 28–32)
MCHC RBC AUTO-ENTMCNC: 33.1 G/DL (ref 31–35)
MCV RBC AUTO: 95.5 FL (ref 81–99)
MONOCYTES # BLD AUTO: 0.6 10*3/UL (ref 0.2–0.8)
MONOCYTES NFR BLD AUTO: 10.6 % (ref 4.4–11.3)
NEUTS SEG NFR BLD AUTO: 77.8 % (ref 38.7–80)
PLATELET # BLD AUTO: 239 X10E3/UL (ref 140–360)
POTASSIUM SERPL-SCNC: 4 MMOL/L (ref 3.5–5.1)
PROT SERPL-MCNC: 6.8 G/DL (ref 6.5–8.1)
RBC # BLD AUTO: 3.54 X10E6/UL (ref 4.3–5.7)
SODIUM SERPL-SCNC: 129 MMOL/L (ref 136–145)
WBC # BLD: 6.03 X10E3/UL (ref 4.8–10.8)

## 2025-01-01 PROCEDURE — 71045 X-RAY EXAM CHEST 1 VIEW: CPT

## 2025-01-01 PROCEDURE — 83880 ASSAY OF NATRIURETIC PEPTIDE: CPT

## 2025-01-01 PROCEDURE — 36415 COLL VENOUS BLD VENIPUNCTURE: CPT

## 2025-01-01 PROCEDURE — 84484 ASSAY OF TROPONIN QUANT: CPT

## 2025-01-01 PROCEDURE — 85025 COMPLETE CBC W/AUTO DIFF WBC: CPT

## 2025-01-01 PROCEDURE — 80053 COMPREHEN METABOLIC PANEL: CPT

## 2025-01-01 PROCEDURE — 99284 EMERGENCY DEPT VISIT MOD MDM: CPT

## 2025-01-01 PROCEDURE — 94799 UNLISTED PULMONARY SVC/PX: CPT

## 2025-01-01 PROCEDURE — 80048 BASIC METABOLIC PNL TOTAL CA: CPT

## 2025-01-01 PROCEDURE — 81001 URINALYSIS AUTO W/SCOPE: CPT

## 2025-01-02 VITALS
OXYGEN SATURATION: 98 % | TEMPERATURE: 97.6 F | DIASTOLIC BLOOD PRESSURE: 76 MMHG | SYSTOLIC BLOOD PRESSURE: 141 MMHG | HEART RATE: 67 BPM | RESPIRATION RATE: 20 BRPM

## 2025-01-02 VITALS
HEART RATE: 61 BPM | RESPIRATION RATE: 29 BRPM | OXYGEN SATURATION: 96 % | SYSTOLIC BLOOD PRESSURE: 142 MMHG | DIASTOLIC BLOOD PRESSURE: 76 MMHG

## 2025-01-02 VITALS
DIASTOLIC BLOOD PRESSURE: 76 MMHG | TEMPERATURE: 97.6 F | OXYGEN SATURATION: 98 % | SYSTOLIC BLOOD PRESSURE: 141 MMHG | HEART RATE: 67 BPM | RESPIRATION RATE: 20 BRPM

## 2025-01-02 VITALS
HEART RATE: 70 BPM | RESPIRATION RATE: 16 BRPM | SYSTOLIC BLOOD PRESSURE: 152 MMHG | OXYGEN SATURATION: 99 % | DIASTOLIC BLOOD PRESSURE: 66 MMHG | TEMPERATURE: 98.1 F

## 2025-01-02 VITALS — OXYGEN SATURATION: 96 % | RESPIRATION RATE: 20 BRPM | HEART RATE: 69 BPM

## 2025-01-02 VITALS — RESPIRATION RATE: 20 BRPM | OXYGEN SATURATION: 98 % | HEART RATE: 63 BPM

## 2025-01-02 VITALS
HEART RATE: 60 BPM | SYSTOLIC BLOOD PRESSURE: 138 MMHG | TEMPERATURE: 98.6 F | OXYGEN SATURATION: 92 % | RESPIRATION RATE: 16 BRPM | DIASTOLIC BLOOD PRESSURE: 66 MMHG

## 2025-01-02 VITALS — HEART RATE: 59 BPM | TEMPERATURE: 98.5 F

## 2025-01-02 VITALS — RESPIRATION RATE: 18 BRPM | OXYGEN SATURATION: 99 % | HEART RATE: 54 BPM

## 2025-01-02 LAB
ANION GAP SERPL CALC-SCNC: 19 MMOL/L (ref 8–16)
BACTERIA URNS QL MICRO: (no result) /HPF
BILIRUB UR QL: (no result)
BUN SERPL-MCNC: 58 MG/DL (ref 7–26)
BUN/CREAT SERPL: 19 (ref 6–25)
CALCIUM SERPL-MCNC: 8.1 MG/DL (ref 8.4–10.2)
CHLORIDE SERPL-SCNC: 98 MMOL/L (ref 98–107)
CLARITY UR: CLEAR
CO2 SERPL-SCNC: 16 MMOL/L (ref 22–29)
COLOR UR: YELLOW
CORONAVIRUS COVID-19 AG: NEGATIVE
DEPRECATED RBC URNS MANUAL-ACNC: (no result) /HPF (ref 0–5)
EGFRCR SERPLBLD CKD-EPI 2021: 21 ML/MIN (ref 60–?)
EPI CELLS URNS QL MICRO: (no result) /LPF
GLUCOSE SERPLBLD-MCNC: 116 MG/DL (ref 74–118)
GLUCOSE UR QL STRIP.AUTO: NEGATIVE
INFLUENZA A AG: NEGATIVE
INFLUENZA B AG: NEGATIVE
KETONES UR QL STRIP.AUTO: (no result)
LEUKOCYTE ESTERASE UR QL STRIP.AUTO: (no result)
NITRITE UR QL STRIP.AUTO: NEGATIVE
PH UR STRIP.AUTO: 5.5 [PH] (ref 5–7)
POTASSIUM SERPL-SCNC: 4 MMOL/L (ref 3.5–5.1)
PROT UR QL STRIP.AUTO: >=300
RENAL EPI CELLS URNS QL MICRO: (no result)
SODIUM SERPL-SCNC: 129 MMOL/L (ref 136–145)
SP GR UR STRIP: 1.02 (ref 1.01–1.02)
UROBILINOGEN UR STRIP-MCNC: 0.2 MG/DL (ref 0.2–1)
WBC #/AREA URNS HPF: (no result) /HPF (ref 0–5)

## 2025-01-02 RX ADMIN — HEPARIN SODIUM SCH UNIT: 5000 INJECTION INTRAVENOUS; SUBCUTANEOUS at 09:32

## 2025-01-02 RX ADMIN — ISOSORBIDE MONONITRATE SCH MG: 20 TABLET ORAL at 09:27

## 2025-01-02 RX ADMIN — FUROSEMIDE SCH MG: 10 INJECTION, SOLUTION INTRAMUSCULAR; INTRAVENOUS at 09:28

## 2025-01-02 RX ADMIN — CARVEDILOL SCH MG: 3.12 TABLET, FILM COATED ORAL at 09:00

## 2025-01-02 RX ADMIN — Medication SCH MG: at 09:26

## 2025-01-02 RX ADMIN — ASPIRIN SCH MG: 81 TABLET, COATED ORAL at 09:26

## 2025-01-02 RX ADMIN — FUROSEMIDE ONE MG: 10 INJECTION, SOLUTION INTRAMUSCULAR; INTRAVENOUS at 00:43

## 2025-01-02 RX ADMIN — HYDRALAZINE HYDROCHLORIDE SCH MG: 25 TABLET ORAL at 09:00

## 2025-01-03 VITALS
DIASTOLIC BLOOD PRESSURE: 83 MMHG | RESPIRATION RATE: 19 BRPM | HEART RATE: 65 BPM | SYSTOLIC BLOOD PRESSURE: 157 MMHG | OXYGEN SATURATION: 98 % | TEMPERATURE: 97.6 F

## 2025-01-03 VITALS
DIASTOLIC BLOOD PRESSURE: 69 MMHG | HEART RATE: 62 BPM | RESPIRATION RATE: 20 BRPM | TEMPERATURE: 97.4 F | SYSTOLIC BLOOD PRESSURE: 138 MMHG | OXYGEN SATURATION: 100 %

## 2025-01-03 VITALS
OXYGEN SATURATION: 100 % | HEART RATE: 67 BPM | DIASTOLIC BLOOD PRESSURE: 95 MMHG | SYSTOLIC BLOOD PRESSURE: 185 MMHG | RESPIRATION RATE: 18 BRPM | TEMPERATURE: 98.6 F

## 2025-01-03 VITALS
RESPIRATION RATE: 19 BRPM | HEART RATE: 70 BPM | DIASTOLIC BLOOD PRESSURE: 79 MMHG | SYSTOLIC BLOOD PRESSURE: 176 MMHG | OXYGEN SATURATION: 97 % | TEMPERATURE: 97.6 F

## 2025-01-03 VITALS
DIASTOLIC BLOOD PRESSURE: 80 MMHG | TEMPERATURE: 97.6 F | OXYGEN SATURATION: 100 % | RESPIRATION RATE: 20 BRPM | HEART RATE: 65 BPM | SYSTOLIC BLOOD PRESSURE: 161 MMHG

## 2025-01-03 VITALS
OXYGEN SATURATION: 98 % | HEART RATE: 56 BPM | TEMPERATURE: 97.6 F | DIASTOLIC BLOOD PRESSURE: 79 MMHG | RESPIRATION RATE: 18 BRPM | SYSTOLIC BLOOD PRESSURE: 176 MMHG

## 2025-01-03 VITALS — HEART RATE: 65 BPM | RESPIRATION RATE: 20 BRPM | OXYGEN SATURATION: 97 %

## 2025-01-03 VITALS — RESPIRATION RATE: 20 BRPM | OXYGEN SATURATION: 95 % | HEART RATE: 68 BPM

## 2025-01-03 VITALS — RESPIRATION RATE: 18 BRPM | OXYGEN SATURATION: 98 % | HEART RATE: 56 BPM

## 2025-01-03 VITALS
RESPIRATION RATE: 20 BRPM | HEART RATE: 69 BPM | DIASTOLIC BLOOD PRESSURE: 90 MMHG | OXYGEN SATURATION: 100 % | TEMPERATURE: 97.9 F | SYSTOLIC BLOOD PRESSURE: 161 MMHG

## 2025-01-03 VITALS
RESPIRATION RATE: 20 BRPM | OXYGEN SATURATION: 97 % | DIASTOLIC BLOOD PRESSURE: 80 MMHG | SYSTOLIC BLOOD PRESSURE: 161 MMHG | TEMPERATURE: 97.6 F | HEART RATE: 65 BPM

## 2025-01-03 LAB
ANION GAP SERPL CALC-SCNC: 18.3 MMOL/L (ref 8–16)
BASOPHILS # BLD AUTO: 0 10*3/UL (ref 0–0.1)
BASOPHILS NFR BLD AUTO: 0.7 % (ref 0–1)
BUN SERPL-MCNC: 51 MG/DL (ref 7–26)
BUN/CREAT SERPL: 17 (ref 6–25)
CALCIUM SERPL-MCNC: 8.3 MG/DL (ref 8.4–10.2)
CHLORIDE SERPL-SCNC: 103 MMOL/L (ref 98–107)
CO2 SERPL-SCNC: 19 MMOL/L (ref 22–29)
DEPRECATED NEUTROPHILS # BLD AUTO: 4.7 10*3/UL (ref 2.1–6.9)
EGFRCR SERPLBLD CKD-EPI 2021: 22 ML/MIN (ref 60–?)
EOSINOPHIL # BLD AUTO: 0.2 10*3/UL (ref 0–0.4)
EOSINOPHIL NFR BLD AUTO: 2.5 % (ref 0–6)
ERYTHROCYTE [DISTWIDTH] IN CORD BLOOD: 12.7 % (ref 11.7–14.4)
GLUCOSE SERPLBLD-MCNC: 84 MG/DL (ref 74–118)
HCT VFR BLD AUTO: 38.9 % (ref 38.2–49.6)
HGB BLD-MCNC: 13 G/DL (ref 14–18)
LYMPHOCYTES # BLD: 0.6 10*3/UL (ref 1–3.2)
LYMPHOCYTES NFR BLD AUTO: 10.1 % (ref 18–39.1)
MCH RBC QN AUTO: 31.6 PG (ref 28–32)
MCHC RBC AUTO-ENTMCNC: 33.4 G/DL (ref 31–35)
MCV RBC AUTO: 94.4 FL (ref 81–99)
MONOCYTES # BLD AUTO: 0.5 10*3/UL (ref 0.2–0.8)
MONOCYTES NFR BLD AUTO: 8.6 % (ref 4.4–11.3)
NEUTS SEG NFR BLD AUTO: 77.9 % (ref 38.7–80)
PLATELET # BLD AUTO: 280 X10E3/UL (ref 140–360)
POTASSIUM SERPL-SCNC: 3.3 MMOL/L (ref 3.5–5.1)
RBC # BLD AUTO: 4.12 X10E6/UL (ref 4.3–5.7)
SODIUM SERPL-SCNC: 137 MMOL/L (ref 136–145)
WBC # BLD: 6.02 X10E3/UL (ref 4.8–10.8)

## 2025-01-03 RX ADMIN — CARVEDILOL SCH MG: 3.12 TABLET, FILM COATED ORAL at 16:59

## 2025-01-03 RX ADMIN — CARVEDILOL ONE MG: 3.12 TABLET, FILM COATED ORAL at 08:35

## 2025-01-03 RX ADMIN — HYDRALAZINE HYDROCHLORIDE ONE MG: 25 TABLET ORAL at 08:35

## 2025-01-03 RX ADMIN — HYDRALAZINE HYDROCHLORIDE SCH MG: 25 TABLET ORAL at 16:59

## 2025-01-04 VITALS
TEMPERATURE: 97.6 F | RESPIRATION RATE: 20 BRPM | SYSTOLIC BLOOD PRESSURE: 167 MMHG | HEART RATE: 63 BPM | OXYGEN SATURATION: 100 % | DIASTOLIC BLOOD PRESSURE: 70 MMHG

## 2025-01-04 VITALS — OXYGEN SATURATION: 97 % | RESPIRATION RATE: 18 BRPM | HEART RATE: 63 BPM

## 2025-01-04 VITALS
HEART RATE: 62 BPM | TEMPERATURE: 98.4 F | OXYGEN SATURATION: 98 % | DIASTOLIC BLOOD PRESSURE: 79 MMHG | RESPIRATION RATE: 18 BRPM | SYSTOLIC BLOOD PRESSURE: 134 MMHG

## 2025-01-04 VITALS — OXYGEN SATURATION: 96 % | RESPIRATION RATE: 18 BRPM | HEART RATE: 62 BPM

## 2025-01-04 VITALS
OXYGEN SATURATION: 99 % | DIASTOLIC BLOOD PRESSURE: 84 MMHG | HEART RATE: 58 BPM | TEMPERATURE: 98.4 F | SYSTOLIC BLOOD PRESSURE: 169 MMHG | RESPIRATION RATE: 18 BRPM

## 2025-01-04 VITALS
SYSTOLIC BLOOD PRESSURE: 134 MMHG | HEART RATE: 61 BPM | DIASTOLIC BLOOD PRESSURE: 72 MMHG | OXYGEN SATURATION: 100 % | RESPIRATION RATE: 18 BRPM | TEMPERATURE: 98 F

## 2025-01-04 VITALS — RESPIRATION RATE: 18 BRPM | HEART RATE: 65 BPM | OXYGEN SATURATION: 96 %

## 2025-01-04 VITALS
SYSTOLIC BLOOD PRESSURE: 149 MMHG | DIASTOLIC BLOOD PRESSURE: 75 MMHG | RESPIRATION RATE: 18 BRPM | TEMPERATURE: 98.6 F | OXYGEN SATURATION: 98 % | HEART RATE: 59 BPM

## 2025-01-04 LAB
ANION GAP SERPL CALC-SCNC: 17.3 MMOL/L (ref 8–16)
BUN SERPL-MCNC: 43 MG/DL (ref 7–26)
BUN/CREAT SERPL: 16 (ref 6–25)
CALCIUM SERPL-MCNC: 8.4 MG/DL (ref 8.4–10.2)
CHLORIDE SERPL-SCNC: 108 MMOL/L (ref 98–107)
CO2 SERPL-SCNC: 20 MMOL/L (ref 22–29)
EGFRCR SERPLBLD CKD-EPI 2021: 24 ML/MIN (ref 60–?)
GLUCOSE SERPLBLD-MCNC: 77 MG/DL (ref 74–118)
POTASSIUM SERPL-SCNC: 3.3 MMOL/L (ref 3.5–5.1)
SODIUM SERPL-SCNC: 142 MMOL/L (ref 136–145)

## 2025-01-05 VITALS
OXYGEN SATURATION: 98 % | DIASTOLIC BLOOD PRESSURE: 76 MMHG | RESPIRATION RATE: 20 BRPM | TEMPERATURE: 97.7 F | HEART RATE: 56 BPM | SYSTOLIC BLOOD PRESSURE: 146 MMHG

## 2025-01-05 VITALS
RESPIRATION RATE: 18 BRPM | HEART RATE: 70 BPM | TEMPERATURE: 98.4 F | DIASTOLIC BLOOD PRESSURE: 91 MMHG | OXYGEN SATURATION: 98 % | SYSTOLIC BLOOD PRESSURE: 170 MMHG

## 2025-01-05 VITALS
RESPIRATION RATE: 18 BRPM | DIASTOLIC BLOOD PRESSURE: 66 MMHG | TEMPERATURE: 97.8 F | OXYGEN SATURATION: 100 % | HEART RATE: 63 BPM | SYSTOLIC BLOOD PRESSURE: 156 MMHG

## 2025-01-05 VITALS
HEART RATE: 70 BPM | OXYGEN SATURATION: 100 % | DIASTOLIC BLOOD PRESSURE: 91 MMHG | RESPIRATION RATE: 18 BRPM | SYSTOLIC BLOOD PRESSURE: 170 MMHG | TEMPERATURE: 98.4 F

## 2025-01-05 VITALS — OXYGEN SATURATION: 98 % | HEART RATE: 67 BPM | RESPIRATION RATE: 18 BRPM

## 2025-01-05 VITALS — OXYGEN SATURATION: 97 % | HEART RATE: 69 BPM | RESPIRATION RATE: 18 BRPM

## 2025-01-05 LAB
ANION GAP SERPL CALC-SCNC: 16.1 MMOL/L (ref 8–16)
BUN SERPL-MCNC: 38 MG/DL (ref 7–26)
BUN/CREAT SERPL: 14 (ref 6–25)
CALCIUM SERPL-MCNC: 8.2 MG/DL (ref 8.4–10.2)
CHLORIDE SERPL-SCNC: 108 MMOL/L (ref 98–107)
CO2 SERPL-SCNC: 19 MMOL/L (ref 22–29)
EGFRCR SERPLBLD CKD-EPI 2021: 24 ML/MIN (ref 60–?)
GLUCOSE SERPLBLD-MCNC: 77 MG/DL (ref 74–118)
POTASSIUM SERPL-SCNC: 3.1 MMOL/L (ref 3.5–5.1)
SODIUM SERPL-SCNC: 140 MMOL/L (ref 136–145)

## 2025-01-05 RX ADMIN — CARVEDILOL SCH MG: 12.5 TABLET, FILM COATED ORAL at 09:24

## 2025-01-05 RX ADMIN — Medication ONE MEQ: at 12:16

## 2025-01-05 RX ADMIN — SODIUM BICARBONATE SCH MG: 650 TABLET ORAL at 09:23

## 2025-01-05 RX ADMIN — HYDRALAZINE HYDROCHLORIDE SCH MG: 25 TABLET ORAL at 09:25

## 2025-01-05 RX ADMIN — ISOSORBIDE MONONITRATE SCH MG: 20 TABLET ORAL at 09:24
